# Patient Record
Sex: FEMALE | Race: BLACK OR AFRICAN AMERICAN | Employment: OTHER | URBAN - METROPOLITAN AREA
[De-identification: names, ages, dates, MRNs, and addresses within clinical notes are randomized per-mention and may not be internally consistent; named-entity substitution may affect disease eponyms.]

---

## 2021-01-20 ENCOUNTER — HOSPITAL ENCOUNTER (EMERGENCY)
Age: 31
Discharge: HOME | End: 2021-01-20
Attending: OBSTETRICS & GYNECOLOGY | Admitting: STUDENT IN AN ORGANIZED HEALTH CARE EDUCATION/TRAINING PROGRAM

## 2021-01-20 VITALS — HEIGHT: 61 IN | BODY MASS INDEX: 48.96 KG/M2 | WEIGHT: 259.3 LBS

## 2021-01-20 NOTE — PROGRESS NOTES
Pt here for airway check due to high BMI. Airway exam showed good TM distance, MP 1, good neck ROM. Should be an appropriate patient to deliver here.

## 2021-02-23 ENCOUNTER — HOSPITAL ENCOUNTER (INPATIENT)
Age: 31
LOS: 3 days | Discharge: HOME | End: 2021-02-26
Attending: OBSTETRICS & GYNECOLOGY | Admitting: OBSTETRICS & GYNECOLOGY
Payer: COMMERCIAL

## 2021-02-23 ENCOUNTER — ANESTHESIA (OUTPATIENT)
Dept: LABOR AND DELIVERY | Age: 31
End: 2021-02-23
Payer: COMMERCIAL

## 2021-02-23 ENCOUNTER — ANESTHESIA EVENT (OUTPATIENT)
Dept: LABOR AND DELIVERY | Age: 31
End: 2021-02-23
Payer: COMMERCIAL

## 2021-02-23 DIAGNOSIS — Z33.1 IUP (INTRAUTERINE PREGNANCY), INCIDENTAL: Primary | ICD-10-CM

## 2021-02-23 LAB
ABO + RH BLD: NORMAL
BASOPHILS # BLD: 0 K/UL (ref 0–0.1)
BASOPHILS NFR BLD: 0 % (ref 0–2)
BLOOD GROUP ANTIBODIES SERPL: NORMAL
DIFFERENTIAL METHOD BLD: ABNORMAL
EOSINOPHIL # BLD: 0 K/UL (ref 0–0.4)
EOSINOPHIL NFR BLD: 0 % (ref 0–5)
ERYTHROCYTE [DISTWIDTH] IN BLOOD BY AUTOMATED COUNT: 13.1 % (ref 11.6–14.5)
HCT VFR BLD AUTO: 40.4 % (ref 35–45)
HGB BLD-MCNC: 13.3 G/DL (ref 12–16)
LYMPHOCYTES # BLD: 1 K/UL (ref 0.9–3.6)
LYMPHOCYTES NFR BLD: 9 % (ref 21–52)
MCH RBC QN AUTO: 30.4 PG (ref 24–34)
MCHC RBC AUTO-ENTMCNC: 32.9 G/DL (ref 31–37)
MCV RBC AUTO: 92.4 FL (ref 74–97)
MONOCYTES # BLD: 0.6 K/UL (ref 0.05–1.2)
MONOCYTES NFR BLD: 6 % (ref 3–10)
NEUTS SEG # BLD: 9.1 K/UL (ref 1.8–8)
NEUTS SEG NFR BLD: 85 % (ref 40–73)
PLATELET # BLD AUTO: 139 K/UL (ref 135–420)
PMV BLD AUTO: 12.7 FL (ref 9.2–11.8)
RBC # BLD AUTO: 4.37 M/UL (ref 4.2–5.3)
SPECIMEN EXP DATE BLD: NORMAL
WBC # BLD AUTO: 10.8 K/UL (ref 4.6–13.2)

## 2021-02-23 PROCEDURE — 74011250636 HC RX REV CODE- 250/636: Performed by: NURSE ANESTHETIST, CERTIFIED REGISTERED

## 2021-02-23 PROCEDURE — 85025 COMPLETE CBC W/AUTO DIFF WBC: CPT

## 2021-02-23 PROCEDURE — 74011000250 HC RX REV CODE- 250: Performed by: NURSE ANESTHETIST, CERTIFIED REGISTERED

## 2021-02-23 PROCEDURE — 74011250636 HC RX REV CODE- 250/636: Performed by: ADVANCED PRACTICE MIDWIFE

## 2021-02-23 PROCEDURE — 75410000003 HC RECOV DEL/VAG/CSECN EA 0.5 HR

## 2021-02-23 PROCEDURE — 77030031139 HC SUT VCRL2 J&J -A: Performed by: OBSTETRICS & GYNECOLOGY

## 2021-02-23 PROCEDURE — 74011250636 HC RX REV CODE- 250/636: Performed by: MIDWIFE

## 2021-02-23 PROCEDURE — 74011250636 HC RX REV CODE- 250/636: Performed by: ANESTHESIOLOGY

## 2021-02-23 PROCEDURE — 75410000003 HC RECOV DEL/VAG/CSECN EA 0.5 HR: Performed by: OBSTETRICS & GYNECOLOGY

## 2021-02-23 PROCEDURE — 77030040830 HC CATH URETH FOL MDII -A

## 2021-02-23 PROCEDURE — 77030032060 HC PWDR HEMSTAT ARISTA ASRB 3GM BARD -C: Performed by: OBSTETRICS & GYNECOLOGY

## 2021-02-23 PROCEDURE — 74011000250 HC RX REV CODE- 250

## 2021-02-23 PROCEDURE — 74011000250 HC RX REV CODE- 250: Performed by: ANESTHESIOLOGY

## 2021-02-23 PROCEDURE — 74011250637 HC RX REV CODE- 250/637: Performed by: ADVANCED PRACTICE MIDWIFE

## 2021-02-23 PROCEDURE — 99283 EMERGENCY DEPT VISIT LOW MDM: CPT

## 2021-02-23 PROCEDURE — 77010026065 HC OXYGEN MINIMUM MEDICAL AIR

## 2021-02-23 PROCEDURE — 74011000258 HC RX REV CODE- 258: Performed by: MIDWIFE

## 2021-02-23 PROCEDURE — 88307 TISSUE EXAM BY PATHOLOGIST: CPT

## 2021-02-23 PROCEDURE — 76060000032 HC ANESTHESIA 0.5 TO 1 HR: Performed by: OBSTETRICS & GYNECOLOGY

## 2021-02-23 PROCEDURE — 77030009413 HC ELECTRD SCALP COVD -A

## 2021-02-23 PROCEDURE — 75410000002 HC LABOR FEE PER 1 HR

## 2021-02-23 PROCEDURE — 76010000391 HC C SECN FIRST 1 HR: Performed by: OBSTETRICS & GYNECOLOGY

## 2021-02-23 PROCEDURE — 65270000029 HC RM PRIVATE

## 2021-02-23 PROCEDURE — 77030040361 HC SLV COMPR DVT MDII -B: Performed by: OBSTETRICS & GYNECOLOGY

## 2021-02-23 PROCEDURE — 77030027138 HC INCENT SPIROMETER -A

## 2021-02-23 PROCEDURE — 86900 BLOOD TYPING SEROLOGIC ABO: CPT

## 2021-02-23 PROCEDURE — 59025 FETAL NON-STRESS TEST: CPT

## 2021-02-23 PROCEDURE — 2709999900 HC NON-CHARGEABLE SUPPLY: Performed by: OBSTETRICS & GYNECOLOGY

## 2021-02-23 PROCEDURE — 76060000078 HC EPIDURAL ANESTHESIA

## 2021-02-23 PROCEDURE — 77030010848 HC CATH INTUTR PRSS KOLB -B

## 2021-02-23 PROCEDURE — 77030019905 HC CATH URETH INTMIT MDII -A

## 2021-02-23 PROCEDURE — 4A1HXCZ MONITORING OF PRODUCTS OF CONCEPTION, CARDIAC RATE, EXTERNAL APPROACH: ICD-10-PCS | Performed by: OBSTETRICS & GYNECOLOGY

## 2021-02-23 RX ORDER — MINERAL OIL
OIL (ML) ORAL
Status: DISCONTINUED
Start: 2021-02-23 | End: 2021-02-23 | Stop reason: WASHOUT

## 2021-02-23 RX ORDER — FENTANYL CITRATE 50 UG/ML
100 INJECTION, SOLUTION INTRAMUSCULAR; INTRAVENOUS ONCE
Status: ACTIVE | OUTPATIENT
Start: 2021-02-23 | End: 2021-02-23

## 2021-02-23 RX ORDER — SODIUM CHLORIDE 0.9 % (FLUSH) 0.9 %
5-40 SYRINGE (ML) INJECTION EVERY 8 HOURS
Status: DISCONTINUED | OUTPATIENT
Start: 2021-02-23 | End: 2021-02-23 | Stop reason: HOSPADM

## 2021-02-23 RX ORDER — ACETAMINOPHEN 325 MG/1
650 TABLET ORAL
Status: DISCONTINUED | OUTPATIENT
Start: 2021-02-23 | End: 2021-02-26 | Stop reason: HOSPADM

## 2021-02-23 RX ORDER — ZOLPIDEM TARTRATE 5 MG/1
5 TABLET ORAL
Status: DISCONTINUED | OUTPATIENT
Start: 2021-02-23 | End: 2021-02-26 | Stop reason: HOSPADM

## 2021-02-23 RX ORDER — NALOXONE HYDROCHLORIDE 0.4 MG/ML
0.2 INJECTION, SOLUTION INTRAMUSCULAR; INTRAVENOUS; SUBCUTANEOUS AS NEEDED
Status: DISCONTINUED | OUTPATIENT
Start: 2021-02-23 | End: 2021-02-23 | Stop reason: HOSPADM

## 2021-02-23 RX ORDER — KETOROLAC TROMETHAMINE 30 MG/ML
30 INJECTION, SOLUTION INTRAMUSCULAR; INTRAVENOUS
Status: DISCONTINUED | OUTPATIENT
Start: 2021-02-23 | End: 2021-02-23 | Stop reason: SDUPTHER

## 2021-02-23 RX ORDER — LIDOCAINE HYDROCHLORIDE AND EPINEPHRINE 15; 5 MG/ML; UG/ML
INJECTION, SOLUTION EPIDURAL
Status: SHIPPED | OUTPATIENT
Start: 2021-02-23 | End: 2021-02-23

## 2021-02-23 RX ORDER — PROMETHAZINE HYDROCHLORIDE 25 MG/ML
25 INJECTION, SOLUTION INTRAMUSCULAR; INTRAVENOUS
Status: DISCONTINUED | OUTPATIENT
Start: 2021-02-23 | End: 2021-02-26 | Stop reason: HOSPADM

## 2021-02-23 RX ORDER — LIDOCAINE HYDROCHLORIDE 10 MG/ML
INJECTION INFILTRATION; PERINEURAL AS NEEDED
Status: DISCONTINUED | OUTPATIENT
Start: 2021-02-23 | End: 2021-02-23 | Stop reason: HOSPADM

## 2021-02-23 RX ORDER — NALBUPHINE HYDROCHLORIDE 10 MG/ML
10 INJECTION, SOLUTION INTRAMUSCULAR; INTRAVENOUS; SUBCUTANEOUS
Status: DISCONTINUED | OUTPATIENT
Start: 2021-02-23 | End: 2021-02-23 | Stop reason: HOSPADM

## 2021-02-23 RX ORDER — DIPHENHYDRAMINE HCL 25 MG
25 CAPSULE ORAL
Status: ACTIVE | OUTPATIENT
Start: 2021-02-23 | End: 2021-02-24

## 2021-02-23 RX ORDER — SODIUM CHLORIDE 0.9 % (FLUSH) 0.9 %
5-40 SYRINGE (ML) INJECTION AS NEEDED
Status: DISCONTINUED | OUTPATIENT
Start: 2021-02-23 | End: 2021-02-26 | Stop reason: HOSPADM

## 2021-02-23 RX ORDER — PHENYLEPHRINE HCL IN 0.9% NACL 1 MG/10 ML
80 SYRINGE (ML) INTRAVENOUS AS NEEDED
Status: DISCONTINUED | OUTPATIENT
Start: 2021-02-23 | End: 2021-02-23 | Stop reason: HOSPADM

## 2021-02-23 RX ORDER — OXYTOCIN/RINGER'S LACTATE 30/500 ML
10 PLASTIC BAG, INJECTION (ML) INTRAVENOUS AS NEEDED
Status: DISCONTINUED | OUTPATIENT
Start: 2021-02-23 | End: 2021-02-26 | Stop reason: HOSPADM

## 2021-02-23 RX ORDER — SODIUM CHLORIDE, SODIUM LACTATE, POTASSIUM CHLORIDE, CALCIUM CHLORIDE 600; 310; 30; 20 MG/100ML; MG/100ML; MG/100ML; MG/100ML
125 INJECTION, SOLUTION INTRAVENOUS CONTINUOUS
Status: DISCONTINUED | OUTPATIENT
Start: 2021-02-23 | End: 2021-02-23 | Stop reason: HOSPADM

## 2021-02-23 RX ORDER — FENTANYL/ROPIVACAINE/NS/PF 2MCG/ML-.1
1-15 PLASTIC BAG, INJECTION (ML) EPIDURAL CONTINUOUS
Status: DISCONTINUED | OUTPATIENT
Start: 2021-02-23 | End: 2021-02-23 | Stop reason: HOSPADM

## 2021-02-23 RX ORDER — DIPHENHYDRAMINE HYDROCHLORIDE 50 MG/ML
25 INJECTION, SOLUTION INTRAMUSCULAR; INTRAVENOUS ONCE
Status: COMPLETED | OUTPATIENT
Start: 2021-02-23 | End: 2021-02-23

## 2021-02-23 RX ORDER — BUTORPHANOL TARTRATE 2 MG/ML
2 INJECTION INTRAMUSCULAR; INTRAVENOUS
Status: DISCONTINUED | OUTPATIENT
Start: 2021-02-23 | End: 2021-02-23 | Stop reason: HOSPADM

## 2021-02-23 RX ORDER — SIMETHICONE 80 MG
80 TABLET,CHEWABLE ORAL
Status: DISCONTINUED | OUTPATIENT
Start: 2021-02-23 | End: 2021-02-26 | Stop reason: HOSPADM

## 2021-02-23 RX ORDER — CEFAZOLIN SODIUM/WATER 2 G/20 ML
SYRINGE (ML) INTRAVENOUS
Status: DISPENSED
Start: 2021-02-23 | End: 2021-02-24

## 2021-02-23 RX ORDER — OXYTOCIN/0.9 % SODIUM CHLORIDE 30/500 ML
PLASTIC BAG, INJECTION (ML) INTRAVENOUS
Status: DISPENSED
Start: 2021-02-23 | End: 2021-02-23

## 2021-02-23 RX ORDER — CHOLECALCIFEROL (VITAMIN D3) 50 MCG
CAPSULE ORAL
COMMUNITY

## 2021-02-23 RX ORDER — CEFAZOLIN SODIUM 1 G/3ML
INJECTION, POWDER, FOR SOLUTION INTRAMUSCULAR; INTRAVENOUS AS NEEDED
Status: DISCONTINUED | OUTPATIENT
Start: 2021-02-23 | End: 2021-02-23 | Stop reason: HOSPADM

## 2021-02-23 RX ORDER — MORPHINE SULFATE 1 MG/ML
3 INJECTION, SOLUTION EPIDURAL; INTRATHECAL; INTRAVENOUS ONCE
Status: DISCONTINUED | OUTPATIENT
Start: 2021-02-23 | End: 2021-02-23 | Stop reason: HOSPADM

## 2021-02-23 RX ORDER — FENTANYL CITRATE 50 UG/ML
INJECTION, SOLUTION INTRAMUSCULAR; INTRAVENOUS AS NEEDED
Status: DISCONTINUED | OUTPATIENT
Start: 2021-02-23 | End: 2021-02-23 | Stop reason: HOSPADM

## 2021-02-23 RX ORDER — OXYTOCIN/0.9 % SODIUM CHLORIDE 30/500 ML
87.3 PLASTIC BAG, INJECTION (ML) INTRAVENOUS AS NEEDED
Status: DISCONTINUED | OUTPATIENT
Start: 2021-02-23 | End: 2021-02-26 | Stop reason: HOSPADM

## 2021-02-23 RX ORDER — SODIUM CHLORIDE 0.9 % (FLUSH) 0.9 %
5-40 SYRINGE (ML) INJECTION AS NEEDED
Status: DISCONTINUED | OUTPATIENT
Start: 2021-02-23 | End: 2021-02-23 | Stop reason: HOSPADM

## 2021-02-23 RX ORDER — KETOROLAC TROMETHAMINE 30 MG/ML
30 INJECTION, SOLUTION INTRAMUSCULAR; INTRAVENOUS EVERY 6 HOURS
Status: DISCONTINUED | OUTPATIENT
Start: 2021-02-23 | End: 2021-02-24

## 2021-02-23 RX ORDER — HYDROMORPHONE HYDROCHLORIDE 1 MG/ML
1 INJECTION, SOLUTION INTRAMUSCULAR; INTRAVENOUS; SUBCUTANEOUS
Status: DISCONTINUED | OUTPATIENT
Start: 2021-02-23 | End: 2021-02-23 | Stop reason: HOSPADM

## 2021-02-23 RX ORDER — OXYTOCIN/0.9 % SODIUM CHLORIDE 30/500 ML
0-20 PLASTIC BAG, INJECTION (ML) INTRAVENOUS
Status: DISCONTINUED | OUTPATIENT
Start: 2021-02-23 | End: 2021-02-26 | Stop reason: HOSPADM

## 2021-02-23 RX ORDER — METHYLERGONOVINE MALEATE 0.2 MG/ML
0.2 INJECTION INTRAVENOUS AS NEEDED
Status: DISCONTINUED | OUTPATIENT
Start: 2021-02-23 | End: 2021-02-23 | Stop reason: HOSPADM

## 2021-02-23 RX ORDER — FACIAL-BODY WIPES
10 EACH TOPICAL
Status: DISCONTINUED | OUTPATIENT
Start: 2021-02-23 | End: 2021-02-26 | Stop reason: HOSPADM

## 2021-02-23 RX ORDER — LIDOCAINE HYDROCHLORIDE AND EPINEPHRINE 15; 5 MG/ML; UG/ML
INJECTION, SOLUTION EPIDURAL AS NEEDED
Status: DISCONTINUED | OUTPATIENT
Start: 2021-02-23 | End: 2021-02-23 | Stop reason: HOSPADM

## 2021-02-23 RX ORDER — SODIUM CHLORIDE, SODIUM LACTATE, POTASSIUM CHLORIDE, CALCIUM CHLORIDE 600; 310; 30; 20 MG/100ML; MG/100ML; MG/100ML; MG/100ML
125 INJECTION, SOLUTION INTRAVENOUS CONTINUOUS
Status: DISPENSED | OUTPATIENT
Start: 2021-02-23 | End: 2021-02-24

## 2021-02-23 RX ORDER — SODIUM CHLORIDE 0.9 % (FLUSH) 0.9 %
5-40 SYRINGE (ML) INJECTION EVERY 8 HOURS
Status: DISCONTINUED | OUTPATIENT
Start: 2021-02-23 | End: 2021-02-26 | Stop reason: HOSPADM

## 2021-02-23 RX ORDER — MINERAL OIL
30 OIL (ML) ORAL AS NEEDED
Status: DISCONTINUED | OUTPATIENT
Start: 2021-02-23 | End: 2021-02-23 | Stop reason: HOSPADM

## 2021-02-23 RX ORDER — LIDOCAINE HYDROCHLORIDE 10 MG/ML
INJECTION INFILTRATION; PERINEURAL
Status: DISCONTINUED
Start: 2021-02-23 | End: 2021-02-23 | Stop reason: WASHOUT

## 2021-02-23 RX ORDER — LIDOCAINE HYDROCHLORIDE AND EPINEPHRINE 20; 5 MG/ML; UG/ML
INJECTION, SOLUTION EPIDURAL; INFILTRATION; INTRACAUDAL; PERINEURAL AS NEEDED
Status: DISCONTINUED | OUTPATIENT
Start: 2021-02-23 | End: 2021-02-23 | Stop reason: HOSPADM

## 2021-02-23 RX ORDER — ONDANSETRON 2 MG/ML
4 INJECTION INTRAMUSCULAR; INTRAVENOUS
Status: DISCONTINUED | OUTPATIENT
Start: 2021-02-23 | End: 2021-02-24 | Stop reason: SDUPTHER

## 2021-02-23 RX ORDER — HYDROCORTISONE 25 MG/G
CREAM TOPICAL AS NEEDED
Status: DISCONTINUED | OUTPATIENT
Start: 2021-02-23 | End: 2021-02-26 | Stop reason: HOSPADM

## 2021-02-23 RX ORDER — CEFAZOLIN SODIUM/WATER 2 G/20 ML
2 SYRINGE (ML) INTRAVENOUS ONCE
Status: DISCONTINUED | OUTPATIENT
Start: 2021-02-23 | End: 2021-02-23 | Stop reason: HOSPADM

## 2021-02-23 RX ORDER — FENTANYL/ROPIVACAINE/NS/PF 2MCG/ML-.1
PLASTIC BAG, INJECTION (ML) EPIDURAL
Status: COMPLETED
Start: 2021-02-23 | End: 2021-02-23

## 2021-02-23 RX ORDER — ONDANSETRON 2 MG/ML
4 INJECTION INTRAMUSCULAR; INTRAVENOUS
Status: DISCONTINUED | OUTPATIENT
Start: 2021-02-23 | End: 2021-02-26 | Stop reason: HOSPADM

## 2021-02-23 RX ORDER — NALBUPHINE HYDROCHLORIDE 10 MG/ML
5 INJECTION, SOLUTION INTRAMUSCULAR; INTRAVENOUS; SUBCUTANEOUS
Status: DISCONTINUED | OUTPATIENT
Start: 2021-02-23 | End: 2021-02-26 | Stop reason: HOSPADM

## 2021-02-23 RX ORDER — ONDANSETRON 2 MG/ML
4 INJECTION INTRAMUSCULAR; INTRAVENOUS
Status: DISCONTINUED | OUTPATIENT
Start: 2021-02-23 | End: 2021-02-23 | Stop reason: HOSPADM

## 2021-02-23 RX ORDER — DIPHENHYDRAMINE HYDROCHLORIDE 50 MG/ML
25 INJECTION, SOLUTION INTRAMUSCULAR; INTRAVENOUS
Status: DISCONTINUED | OUTPATIENT
Start: 2021-02-23 | End: 2021-02-26 | Stop reason: HOSPADM

## 2021-02-23 RX ORDER — FENTANYL CITRATE 50 UG/ML
INJECTION, SOLUTION INTRAMUSCULAR; INTRAVENOUS
Status: COMPLETED
Start: 2021-02-23 | End: 2021-02-23

## 2021-02-23 RX ORDER — NALOXONE HYDROCHLORIDE 0.4 MG/ML
0.4 INJECTION, SOLUTION INTRAMUSCULAR; INTRAVENOUS; SUBCUTANEOUS
Status: ACTIVE | OUTPATIENT
Start: 2021-02-23 | End: 2021-02-24

## 2021-02-23 RX ORDER — IBUPROFEN 400 MG/1
800 TABLET ORAL
Status: DISCONTINUED | OUTPATIENT
Start: 2021-02-26 | End: 2021-02-24

## 2021-02-23 RX ORDER — TERBUTALINE SULFATE 1 MG/ML
0.25 INJECTION SUBCUTANEOUS
Status: DISCONTINUED | OUTPATIENT
Start: 2021-02-23 | End: 2021-02-23 | Stop reason: HOSPADM

## 2021-02-23 RX ORDER — OXYCODONE HYDROCHLORIDE 5 MG/1
10 TABLET ORAL
Status: ACTIVE | OUTPATIENT
Start: 2021-02-23 | End: 2021-02-24

## 2021-02-23 RX ORDER — OXYTOCIN/RINGER'S LACTATE 30/500 ML
10 PLASTIC BAG, INJECTION (ML) INTRAVENOUS AS NEEDED
Status: DISCONTINUED | OUTPATIENT
Start: 2021-02-23 | End: 2021-02-23 | Stop reason: HOSPADM

## 2021-02-23 RX ORDER — OXYCODONE AND ACETAMINOPHEN 5; 325 MG/1; MG/1
1-2 TABLET ORAL
Status: DISCONTINUED | OUTPATIENT
Start: 2021-02-23 | End: 2021-02-24

## 2021-02-23 RX ORDER — LIDOCAINE HYDROCHLORIDE 10 MG/ML
20 INJECTION, SOLUTION EPIDURAL; INFILTRATION; INTRACAUDAL; PERINEURAL AS NEEDED
Status: DISCONTINUED | OUTPATIENT
Start: 2021-02-23 | End: 2021-02-23 | Stop reason: HOSPADM

## 2021-02-23 RX ORDER — ACETAMINOPHEN 500 MG
1000 TABLET ORAL EVERY 6 HOURS
Status: DISCONTINUED | OUTPATIENT
Start: 2021-02-23 | End: 2021-02-26 | Stop reason: HOSPADM

## 2021-02-23 RX ORDER — MORPHINE SULFATE 1 MG/ML
INJECTION, SOLUTION EPIDURAL; INTRATHECAL; INTRAVENOUS AS NEEDED
Status: DISCONTINUED | OUTPATIENT
Start: 2021-02-23 | End: 2021-02-23 | Stop reason: HOSPADM

## 2021-02-23 RX ORDER — OXYTOCIN/0.9 % SODIUM CHLORIDE 30/500 ML
PLASTIC BAG, INJECTION (ML) INTRAVENOUS
Status: COMPLETED
Start: 2021-02-23 | End: 2021-02-23

## 2021-02-23 RX ORDER — OXYTOCIN/0.9 % SODIUM CHLORIDE 30/500 ML
87.3 PLASTIC BAG, INJECTION (ML) INTRAVENOUS AS NEEDED
Status: DISCONTINUED | OUTPATIENT
Start: 2021-02-23 | End: 2021-02-23 | Stop reason: HOSPADM

## 2021-02-23 RX ADMIN — SODIUM CHLORIDE, SODIUM LACTATE, POTASSIUM CHLORIDE, AND CALCIUM CHLORIDE: 600; 310; 30; 20 INJECTION, SOLUTION INTRAVENOUS at 17:23

## 2021-02-23 RX ADMIN — SODIUM CHLORIDE 5 MILLION UNITS: 900 INJECTION INTRAVENOUS at 01:53

## 2021-02-23 RX ADMIN — ONDANSETRON 4 MG: 2 INJECTION INTRAMUSCULAR; INTRAVENOUS at 10:46

## 2021-02-23 RX ADMIN — SODIUM CHLORIDE 2.5 MILLION UNITS: 9 INJECTION, SOLUTION INTRAVENOUS at 10:25

## 2021-02-23 RX ADMIN — MORPHINE SULFATE 3 MG: 1 INJECTION, SOLUTION EPIDURAL; INTRATHECAL; INTRAVENOUS at 17:20

## 2021-02-23 RX ADMIN — SODIUM CHLORIDE, SODIUM LACTATE, POTASSIUM CHLORIDE, AND CALCIUM CHLORIDE 125 ML/HR: 600; 310; 30; 20 INJECTION, SOLUTION INTRAVENOUS at 02:00

## 2021-02-23 RX ADMIN — FENTANYL CITRATE 100 MCG: 50 INJECTION, SOLUTION INTRAMUSCULAR; INTRAVENOUS at 02:38

## 2021-02-23 RX ADMIN — CEFAZOLIN 2 G: 1 INJECTION, POWDER, FOR SOLUTION INTRAMUSCULAR; INTRAVENOUS at 17:07

## 2021-02-23 RX ADMIN — Medication 12 ML/HR: at 02:54

## 2021-02-23 RX ADMIN — SODIUM CHLORIDE, SODIUM LACTATE, POTASSIUM CHLORIDE, AND CALCIUM CHLORIDE 500 ML: 600; 310; 30; 20 INJECTION, SOLUTION INTRAVENOUS at 15:08

## 2021-02-23 RX ADMIN — OXYCODONE AND ACETAMINOPHEN 1 TABLET: 5; 325 TABLET ORAL at 20:30

## 2021-02-23 RX ADMIN — LIDOCAINE HYDROCHLORIDE,EPINEPHRINE BITARTRATE 3 ML: 20; .005 INJECTION, SOLUTION EPIDURAL; INFILTRATION; INTRACAUDAL; PERINEURAL at 16:52

## 2021-02-23 RX ADMIN — Medication 2 MILLI-UNITS/MIN: at 08:47

## 2021-02-23 RX ADMIN — LIDOCAINE HYDROCHLORIDE 5 ML: 10 INJECTION, SOLUTION INFILTRATION; PERINEURAL at 02:32

## 2021-02-23 RX ADMIN — LIDOCAINE HYDROCHLORIDE,EPINEPHRINE BITARTRATE 2 ML: 15; .005 INJECTION, SOLUTION EPIDURAL; INFILTRATION; INTRACAUDAL; PERINEURAL at 02:35

## 2021-02-23 RX ADMIN — SODIUM CHLORIDE 2.5 MILLION UNITS: 9 INJECTION, SOLUTION INTRAVENOUS at 05:45

## 2021-02-23 RX ADMIN — LIDOCAINE HYDROCHLORIDE,EPINEPHRINE BITARTRATE 4 ML: 20; .005 INJECTION, SOLUTION EPIDURAL; INFILTRATION; INTRACAUDAL; PERINEURAL at 17:00

## 2021-02-23 RX ADMIN — KETOROLAC TROMETHAMINE 30 MG: 30 INJECTION, SOLUTION INTRAMUSCULAR at 19:07

## 2021-02-23 RX ADMIN — Medication 333 UNITS/HR: at 17:16

## 2021-02-23 RX ADMIN — SODIUM CHLORIDE, SODIUM LACTATE, POTASSIUM CHLORIDE, AND CALCIUM CHLORIDE 1000 ML: 600; 310; 30; 20 INJECTION, SOLUTION INTRAVENOUS at 02:22

## 2021-02-23 RX ADMIN — LIDOCAINE HYDROCHLORIDE,EPINEPHRINE BITARTRATE 5 ML: 20; .005 INJECTION, SOLUTION EPIDURAL; INFILTRATION; INTRACAUDAL; PERINEURAL at 16:55

## 2021-02-23 RX ADMIN — LIDOCAINE HYDROCHLORIDE,EPINEPHRINE BITARTRATE 3 ML: 15; .005 INJECTION, SOLUTION EPIDURAL; INFILTRATION; INTRACAUDAL; PERINEURAL at 02:30

## 2021-02-23 RX ADMIN — DIPHENHYDRAMINE HYDROCHLORIDE 25 MG: 50 INJECTION, SOLUTION INTRAMUSCULAR; INTRAVENOUS at 14:32

## 2021-02-23 NOTE — PROGRESS NOTES
Bedside shift change report given to CHETAN Murillo (oncoming nurse) by SIERRA Lin RN (offgoing nurse). Report included the following information SBAR, Kardex, Intake/Output, MAR and Recent Results.

## 2021-02-23 NOTE — PROGRESS NOTES
Pt still with thick anterior lip but FHR with deep variables that return to baseline. Attempted pushing to see if baby's head would turn but no progress after pushing several times in supine and right lateral position. Stopped pushing and pt repositioned in hands and knees position. Will reassess for fetal head position. Pt feeling some abdominal pressure but no vaginal/recatal pressure with uc's.    Serina Soto, SHARMILAM

## 2021-02-23 NOTE — PROGRESS NOTES
Pt has not voided since getting epidural around 0200 this morning. Pt unable to void on bedpan. Sterile st cath complete. Pt tolerated well and assisted to left lateral fire hydrant position.

## 2021-02-23 NOTE — PROGRESS NOTES
Dr. Rachael Covington at bedside to discuss POC for Primary  Section at this time. Pt agrees to [facility].

## 2021-02-23 NOTE — PROGRESS NOTES
FSE and IUPC applied per Mi Amezcua CNM. Pt tolerated well and assisted to hands and knees position in trendelenburg position. Pt tolerated well. Pt denies further needs or concerns at this time.

## 2021-02-23 NOTE — H&P
History & Physical    Name: Devon Chawla MRN: [id number]  SSN: [id number]    YOB: 1990  Age: 26 y.o. Sex: female        Subjective:     Estimated Date of Delivery: 21  OB History        3    Para   2    Term                AB        Living   2       SAB        TAB        Ectopic        Molar        Multiple        Live Births                      Ms. Vanessa Olivas is admitted with pregnancy at 40w6d for Increasingly painful contractions. Prenatal course was complicated by obesity, urinary tract dilation. Please see prenatal records for details. Not on File    Objective:     Vitals:  Vitals:    21 0103 21 0105   BP:  116/65   Pulse:  92   Resp:  18   Temp:  98.6 °F (37 °C)   Weight: 115.7 kg (255 lb)    Height: 5' (1.524 m)         Physical Exam:  Patient without distress. Heart: Regular rate and rhythm, S1S2 present or without murmur or extra heart sounds  Lung: clear to auscultation throughout lung fields, no wheezes, no rales, no rhonchi and normal respiratory effort  Abdomen: soft, nontender  Fundus: soft and non tender  Perineum: blood absent, amniotic fluid absent  Cervical Exam: 6 cm dilated  By RN exam  Membranes:  Intact  Fetal Heart Rate & Contraction pattern: Baseline: 135 per minute  Variability: moderate  Accelerations: yes  Decelerations: none  Uterine contractions: regular, every 2-4 minutes    Prenatal Labs:   No results found for: RUBELLAEXT, GRBSEXT, HBSAGEXT, HIVEXT, RPREXT, GONNOEXT, CHLAMEXT      Assessment/Plan:     Plan: Admit for Reassuring fetal status, Labor  Progressing normally  Continue expectant management, Continue plan for vaginal delivery. Group B Strep was positive, will treat prophylactically with penicillin.    -continuous efm x2  -PIV, LR  -CBC, t&s  -anesthesia consult, pt desires epidural  -Anticipate vaginal delivery, c/d for maternal/fetal indications. Dr. Mónica Rome aware of admission and POC at this time.      Signed By:  Albin Prader Frank Cronin     February 23, 2021

## 2021-02-23 NOTE — LACTATION NOTE
This note was copied from a baby's chart. 0 Mom educated on breastfeeding basics--hunger cues, feeding on demand, waking baby if baby sleeps too long between feeds, importance of skin to skin, positioning and latching, risk of pacifier use and supplemental feedings, and importance of rooming in--and use of log sheet. Mom also educated on benefits of breastfeeding for herself and baby. Mom verbalized understanding. No questions at this time. 1826 infant latched and nursing well at this time.

## 2021-02-23 NOTE — ANESTHESIA PROCEDURE NOTES
Epidural Block    Patient location during procedure: OB  Start time: 2/23/2021 2:30 AM  End time: 2/23/2021 4:56 AM  Reason for block: labor epidural  Staffing  Performed: attending   Anesthesiologist: Tom Vallecillo MD  Preanesthetic Checklist  Completed: patient identified, IV checked, site marked, risks and benefits discussed, surgical consent, monitors and equipment checked, pre-op evaluation and timeout performed  Block Placement  Patient position: sitting  Prep: ChloraPrep  Sterility prep: cap, drape, gloves and mask  Sedation level: no sedation  Patient monitoring: continuous pulse oximetry, frequent blood pressure checks and heart rate  Approach: midline  Location: lumbar  Epidural  Loss of resistance technique: saline  Guidance: landmark technique and ultrasound guided  Needle  Needle type: Tuohy   Needle gauge: 19 G  Needle length: 9 cm  Needle insertion depth: 7 cm  Catheter type: multi-orifice  Catheter size: 20 G  Catheter at skin depth: 12 cm  Catheter securement method: clear occlusive dressing, surgical tape and liquid medical adhesive  Test dose: negative  Medications Administered  Lidocaine-EPINEPHrine (XYLOCAINE) 1.5 %-1:200,000 Epidural, 3 mL  Assessment  Sensory level: T10  Block outcome: pain improved  Number of attempts: 1  Procedure assessment: patient tolerated procedure well with no complications  Additional Notes  After epidural placement, patient without sensory or motor block. Patient denies headache or backache.

## 2021-02-23 NOTE — PROGRESS NOTES
Problem: Patient Education: Go to Patient Education Activity  Goal: Patient/Family Education  Outcome: Progressing Towards Goal     Problem: Vaginal Delivery: Day of Deliver-Laboring  Goal: Off Pathway (Use only if patient is Off Pathway)  Outcome: Progressing Towards Goal  Goal: Activity/Safety  Outcome: Progressing Towards Goal  Goal: Consults, if ordered  Outcome: Progressing Towards Goal  Goal: Diagnostic Test/Procedures  Outcome: Progressing Towards Goal  Goal: Nutrition/Diet  Outcome: Progressing Towards Goal  Goal: Discharge Planning  Outcome: Progressing Towards Goal  Goal: Medications  Outcome: Progressing Towards Goal  Goal: Respiratory  Outcome: Progressing Towards Goal  Goal: Treatments/Interventions/Procedures  Outcome: Progressing Towards Goal  Goal: *Vital signs within defined limits  Outcome: Progressing Towards Goal  Goal: *Labs within defined limits  Outcome: Progressing Towards Goal  Goal: *Hemodynamically stable  Outcome: Progressing Towards Goal  Goal: *Optimal pain control at patient's stated goal  Outcome: Progressing Towards Goal     Problem: Vaginal Delivery: Day of Delivery-Post delivery  Goal: Off Pathway (Use only if patient is Off Pathway)  Outcome: Progressing Towards Goal  Goal: Activity/Safety  Outcome: Progressing Towards Goal  Goal: Consults, if ordered  Outcome: Progressing Towards Goal  Goal: Nutrition/Diet  Outcome: Progressing Towards Goal  Goal: Discharge Planning  Outcome: Progressing Towards Goal  Goal: Medications  Outcome: Progressing Towards Goal  Goal: Treatments/Interventions/Procedures  Outcome: Progressing Towards Goal  Goal: *Vital signs within defined limits  Outcome: Progressing Towards Goal  Goal: *Labs within defined limits  Outcome: Progressing Towards Goal  Goal: *Hemodynamically stable  Outcome: Progressing Towards Goal  Goal: *Optimal pain control at patient's stated goal  Outcome: Progressing Towards Goal  Goal: *Participates in infant care  Outcome: Progressing Towards Goal  Goal: *Demonstrates progressive activity  Outcome: Progressing Towards Goal  Goal: *Tolerating diet  Outcome: Progressing Towards Goal     Problem: Vaginal Delivery: Postpartum Day 1  Goal: Off Pathway (Use only if patient is Off Pathway)  Outcome: Progressing Towards Goal  Goal: Activity/Safety  Outcome: Progressing Towards Goal  Goal: Consults, if ordered  Outcome: Progressing Towards Goal  Goal: Diagnostic Test/Procedures  Outcome: Progressing Towards Goal  Goal: Nutrition/Diet  Outcome: Progressing Towards Goal  Goal: Discharge Planning  Outcome: Progressing Towards Goal  Goal: Medications  Outcome: Progressing Towards Goal  Goal: Treatments/Interventions/Procedures  Outcome: Progressing Towards Goal  Goal: Psychosocial  Outcome: Progressing Towards Goal  Goal: *Vital signs within defined limits  Outcome: Progressing Towards Goal  Goal: *Labs within defined limits  Outcome: Progressing Towards Goal  Goal: *Hemodynamically stable  Outcome: Progressing Towards Goal  Goal: *Optimal pain control at patient's stated goal  Outcome: Progressing Towards Goal  Goal: *Participates in infant care  Outcome: Progressing Towards Goal  Goal: *Demonstrates progressive activity  Outcome: Progressing Towards Goal  Goal: *Performs self perineal care  Outcome: Progressing Towards Goal  Goal: *Appropriate parent-infant bonding  Outcome: Progressing Towards Goal  Goal: *Tolerating diet  Outcome: Progressing Towards Goal  Goal: *Performs self breast care  Outcome: Progressing Towards Goal     Problem: Vaginal Delivery: Postpartum 2  Goal: Off Pathway (Use only if patient is Off Pathway)  Outcome: Progressing Towards Goal  Goal: Activity/Safety  Outcome: Progressing Towards Goal  Goal: Consults, if ordered  Outcome: Progressing Towards Goal  Goal: Nutrition/Diet  Outcome: Progressing Towards Goal  Goal: Discharge Planning  Outcome: Progressing Towards Goal  Goal: Medications  Outcome: Progressing Towards Goal  Goal: Treatments/Interventions/Procedures  Outcome: Progressing Towards Goal  Goal: Psychosocial  Outcome: Progressing Towards Goal     Problem: Vaginal Delivery: Discharge Outcomes  Goal: *Verbalizes name, dosage, time, side effects, and number of days to continue medications  Outcome: Progressing Towards Goal  Goal: *Describes available resources and support systems  Outcome: Progressing Towards Goal  Goal: *No signs and symptoms of infection  Outcome: Progressing Towards Goal  Goal: *Birth certificate information completed  Outcome: Progressing Towards Goal  Goal: *Received and verbalizes understanding of discharge plan and instructions  Outcome: Progressing Towards Goal  Goal: *Vital signs within defined limits  Outcome: Progressing Towards Goal  Goal: *Labs within defined limits  Outcome: Progressing Towards Goal  Goal: *Hemodynamically stable  Outcome: Progressing Towards Goal  Goal: *Optimal pain control at patient's stated goal  Outcome: Progressing Towards Goal  Goal: *Participates in infant care  Outcome: Progressing Towards Goal  Goal: *Demonstrates progressive activity  Outcome: Progressing Towards Goal  Goal: *Appropriate parent-infant bonding  Outcome: Progressing Towards Goal  Goal: *Tolerating diet  Outcome: Progressing Towards Goal     Problem: Pain  Goal: *Control of Pain  Outcome: Progressing Towards Goal  Goal: *PALLIATIVE CARE:  Alleviation of Pain  Outcome: Progressing Towards Goal     Problem: Patient Education: Go to Patient Education Activity  Goal: Patient/Family Education  Outcome: Progressing Towards Goal

## 2021-02-23 NOTE — PROGRESS NOTES
36 w arrived to unit c/o ctx. Pt taken to room  8, changed into gown and hooked to EFM. Pt c/o pain of 10/10. Pt denies  Bleeding or any distress. SVE 7-8 Buldging bag    0145:  Dr Jenkins Just paged for epidural requested by pt.     0147: Dr Taran Hair is on the way    0222:  Dr. Taran Hair @ bedside discussing epidural. Risk and benefits discussed with pt. Pt wished to proceed. Confirmation of signed consent form. Pt positioned at side of bed for procedure.      0230:  Timeout performed.      0235:  Test dose administered.      0236:  Catheter placed.      0238:  Loading dose administered. Fentanyl vial given to [employer] to be administered through epidural.    0242:  Pt lying back in bed. TOCO and US adjusted.      PCA pump started.     0303:  Pt BP ,  Ephedrine    0309:  Pt turned to left side, lates noted,     0318:  Lates resolved

## 2021-02-23 NOTE — OP NOTES
Postoperative Note    Patient: Devang Stephens  YOB: 1990  MRN: [id number]    Date of Procedure: 2021     Pre-Op Diagnosis: IUP at 40 weeks gestation,Fetal Intolerance of Labor, Primary  Section, Maternal obesity    Post-Op Diagnosis: Same as preoperative diagnosis. Procedure(s):   SECTION    Surgeon(s):  Anamaria Morrison MD    Surgical Assistant: None    Anesthesia: Epidural     Estimated Blood Loss (mL): 344 cc    Complications: None    Specimens:   ID Type Source Tests Collected by Time Destination   1 : Placenta Fresh Uterus  Anamaria Morrison MD 2021 1735 Pathology        Implants: * No implants in log *    Drains: * No LDAs found *    Findings: viable male infant, Apgar's 8 - 9, normal uterus, ovaries and tubes      Procedure:   Patient was taken to the OR after informed consent had been obtained. Spinal epidural was then placed. She was then placed in a dorsal supine position with a left lateral tilt. She was prepped and draped in a sterile fashion. Time out was completed. Attention was turned to the abdomen and an Allis test confirmed adequate anesthesia. A Pfannenstiel skin incision was made 3 cm above the symphysis pubis. The incision was carried down to the fascia. The fascia was opened in the midline. The subcutaneous tissue and fascia were extended bilaterally. The rectus muscle was divided bluntly. The peritoneum was entered. The bladder blade was inserted. The uterus was scored in the lower uterine segment and then entered in the midline. The uterine incision was extended bilaterally, transversly. The fetal head was flexed, pressure was applied to the abdomen and the fetal head was delivered followed by the body. The cord was clamped and cut. The placenta was manually extracted. The uterus was cleared of all clot and debris. The incision was closed with 0 Vicryl in a running fashion. A second imbricating layer of 0 Vicryl was placed. Figures-of-eight placed along uterine incision. Therese was applied. Hemostasis was noted. The fascia was closed in a running fashion. The subcutaneous layer was closed with 0 Vicryl running. The skin was closed with Ensorb dissolvable sutures. The counts were correct. The mother and child tolerated the procedure well.      Paul Kimble DO            Electronically Signed by Arsh Galaviz MD on 2/23/2021 at 5:41 PM

## 2021-02-23 NOTE — PROGRESS NOTES
Pushing stopped at this time due to lack of progress. POC at this time to position pt and then recheck once pt is feeling pressure.

## 2021-02-23 NOTE — ROUTINE PROCESS
Bedside and Verbal shift change report given to Geovanni Daley Rn (oncoming nurse) by Darleen Grande RN (offgoing nurse). Report included the following information SBAR, Kardex, Intake/Output, MAR, Recent Results and Med Rec.

## 2021-02-23 NOTE — PROGRESS NOTES
Nurse and CNM at bedside for pushing. Nurse and CNM remain at bedside continuously monitorin FHR tracing and Maternal effort continuously.

## 2021-02-23 NOTE — PROGRESS NOTES
Jade Pac, CNM at bedside for SVE and to practice push. CNM and nurse remain at bedside continuously monitoring FHR tracing and maternal status.

## 2021-02-23 NOTE — ANESTHESIA PREPROCEDURE EVALUATION
Relevant Problems   No relevant active problems       Anesthetic History   No history of anesthetic complications            Review of Systems / Medical History  Patient summary reviewed, nursing notes reviewed and pertinent labs reviewed    Pulmonary  Within defined limits                 Neuro/Psych   Within defined limits           Cardiovascular  Within defined limits                Exercise tolerance: >4 METS     GI/Hepatic/Renal  Within defined limits              Endo/Other        Morbid obesity     Other Findings              Physical Exam    Airway  Mallampati: III  TM Distance: 4 - 6 cm  Neck ROM: decreased range of motion   Mouth opening: Normal     Cardiovascular  Regular rate and rhythm,  S1 and S2 normal,  no murmur, click, rub, or gallop  Rhythm: regular  Rate: normal         Dental  No notable dental hx       Pulmonary  Breath sounds clear to auscultation               Abdominal  GI exam deferred       Other Findings            Anesthetic Plan    ASA: 3, emergent  Anesthesia type: epidural            Anesthetic plan and risks discussed with: Patient and Spouse      Risks of bleeding, infection, nerve injury, failed block, spinal tap causing headache and requiring epidural blood patch, back pain, and failed block discussed. Procedure described as elective. Pt understands and desires to proceed.   Dose existing epidural for C/S

## 2021-02-23 NOTE — PROGRESS NOTES
Problem: Patient Education: Go to Patient Education Activity  Goal: Patient/Family Education  Outcome: Progressing Towards Goal     Problem: Vaginal Delivery: Day of Deliver-Laboring  Goal: Off Pathway (Use only if patient is Off Pathway)  Outcome: Progressing Towards Goal  Goal: Activity/Safety  Outcome: Progressing Towards Goal  Goal: Consults, if ordered  Outcome: Progressing Towards Goal  Goal: Diagnostic Test/Procedures  Outcome: Progressing Towards Goal  Goal: Nutrition/Diet  Outcome: Progressing Towards Goal  Goal: Discharge Planning  Outcome: Progressing Towards Goal  Goal: Medications  Outcome: Progressing Towards Goal  Goal: Respiratory  Outcome: Progressing Towards Goal  Goal: Treatments/Interventions/Procedures  Outcome: Progressing Towards Goal  Goal: *Vital signs within defined limits  Outcome: Progressing Towards Goal  Goal: *Labs within defined limits  Outcome: Progressing Towards Goal  Goal: *Hemodynamically stable  Outcome: Progressing Towards Goal  Goal: *Optimal pain control at patient's stated goal  Outcome: Progressing Towards Goal     Problem: Pain  Goal: *Control of Pain  Outcome: Progressing Towards Goal  Goal: *PALLIATIVE CARE:  Alleviation of Pain  Outcome: Progressing Towards Goal     Problem: Patient Education: Go to Patient Education Activity  Goal: Patient/Family Education  Outcome: Progressing Towards Goal     Problem: Falls - Risk of  Goal: *Absence of Falls  Description: Document Aidan Fall Risk and appropriate interventions in the flowsheet.   Outcome: Progressing Towards Goal  Note: Fall Risk Interventions:                                Problem: Patient Education: Go to Patient Education Activity  Goal: Patient/Family Education  Outcome: Progressing Towards Goal

## 2021-02-23 NOTE — PROGRESS NOTES
Labor Progress Note  Patient seen, fetal heart rate and contraction pattern evaluated, patient examined. Patient Vitals for the past 1 hrs:    Physical Exam:  Cervical Exam: 9  Membranes:  Spontaneous Rupture of Membranes; Amniotic Fluid: clear fluid  Uterine Activity: Frequency: Every 5 minutes  Fetal Heart Rate: Reactive    Assessment/Plan:  Reassuring FHR. Pitocin ordered per protocol, Large amount of amnitioc fluid leaking, Repositioned in high fowlers position. pt feels pressure when laying on her side. Will anticipate vaginal delivery.      Sushila Carlin CNM

## 2021-02-24 LAB
HCT VFR BLD AUTO: 32.5 % (ref 35–45)
HGB BLD-MCNC: 10.4 G/DL (ref 12–16)

## 2021-02-24 PROCEDURE — 74011250637 HC RX REV CODE- 250/637: Performed by: ADVANCED PRACTICE MIDWIFE

## 2021-02-24 PROCEDURE — 65270000029 HC RM PRIVATE

## 2021-02-24 PROCEDURE — 85018 HEMOGLOBIN: CPT

## 2021-02-24 PROCEDURE — 85014 HEMATOCRIT: CPT

## 2021-02-24 PROCEDURE — 74011250636 HC RX REV CODE- 250/636: Performed by: ADVANCED PRACTICE MIDWIFE

## 2021-02-24 PROCEDURE — 36415 COLL VENOUS BLD VENIPUNCTURE: CPT

## 2021-02-24 RX ORDER — IBUPROFEN 400 MG/1
800 TABLET ORAL
Status: DISCONTINUED | OUTPATIENT
Start: 2021-02-25 | End: 2021-02-26 | Stop reason: HOSPADM

## 2021-02-24 RX ORDER — OXYCODONE AND ACETAMINOPHEN 5; 325 MG/1; MG/1
1-2 TABLET ORAL
Status: DISCONTINUED | OUTPATIENT
Start: 2021-02-24 | End: 2021-02-26 | Stop reason: HOSPADM

## 2021-02-24 RX ORDER — OXYCODONE AND ACETAMINOPHEN 5; 325 MG/1; MG/1
1-2 TABLET ORAL
Qty: 30 TAB | Refills: 0 | Status: SHIPPED | OUTPATIENT
Start: 2021-02-24 | End: 2021-02-26

## 2021-02-24 RX ORDER — IBUPROFEN 800 MG/1
800 TABLET ORAL
Qty: 90 TAB | Refills: 1 | Status: SHIPPED | OUTPATIENT
Start: 2021-02-25

## 2021-02-24 RX ADMIN — ACETAMINOPHEN 1000 MG: 500 TABLET ORAL at 15:05

## 2021-02-24 RX ADMIN — ACETAMINOPHEN 1000 MG: 500 TABLET ORAL at 03:20

## 2021-02-24 RX ADMIN — KETOROLAC TROMETHAMINE 30 MG: 30 INJECTION, SOLUTION INTRAMUSCULAR at 21:15

## 2021-02-24 RX ADMIN — KETOROLAC TROMETHAMINE 30 MG: 30 INJECTION, SOLUTION INTRAMUSCULAR at 09:28

## 2021-02-24 RX ADMIN — ACETAMINOPHEN 1000 MG: 500 TABLET ORAL at 21:15

## 2021-02-24 RX ADMIN — ACETAMINOPHEN 1000 MG: 500 TABLET ORAL at 09:28

## 2021-02-24 RX ADMIN — KETOROLAC TROMETHAMINE 30 MG: 30 INJECTION, SOLUTION INTRAMUSCULAR at 03:20

## 2021-02-24 RX ADMIN — KETOROLAC TROMETHAMINE 30 MG: 30 INJECTION, SOLUTION INTRAMUSCULAR at 15:03

## 2021-02-24 NOTE — PROGRESS NOTES
Problem: Patient Education: Go to Patient Education Activity  Goal: Patient/Family Education  Outcome: Progressing Towards Goal     Problem: Pain  Goal: *Control of Pain  Outcome: Progressing Towards Goal     Problem: Patient Education: Go to Patient Education Activity  Goal: Patient/Family Education  Outcome: Progressing Towards Goal     Problem: Falls - Risk of  Goal: *Absence of Falls  Description: Document Namna Jack Fall Risk and appropriate interventions in the flowsheet.   Outcome: Progressing Towards Goal  Note: Fall Risk Interventions:                                Problem: Patient Education: Go to Patient Education Activity  Goal: Patient/Family Education  Outcome: Progressing Towards Goal     Problem: Lactation Care Plan  Goal: *Infant latching appropriately  Outcome: Progressing Towards Goal  Goal: *Weight loss less than 10% of birth weight  Outcome: Progressing Towards Goal     Problem: Patient Education: Go to Patient Education Activity  Goal: Patient/Family Education  Outcome: Progressing Towards Goal     Problem:  Delivery: Day of Delivery  Goal: Activity/Safety  Outcome: Progressing Towards Goal  Goal: Consults, if ordered  Outcome: Progressing Towards Goal  Goal: Diagnostic Test/Procedures  Outcome: Progressing Towards Goal  Goal: Nutrition/Diet  Outcome: Progressing Towards Goal  Goal: Discharge Planning  Outcome: Progressing Towards Goal  Goal: Medications  Outcome: Progressing Towards Goal  Goal: Respiratory  Outcome: Progressing Towards Goal  Goal: Treatments/Interventions/Procedures  Outcome: Progressing Towards Goal  Goal: Psychosocial  Outcome: Progressing Towards Goal  Goal: *Vital signs within defined limits  Outcome: Progressing Towards Goal  Goal: *Labs within defined limits  Outcome: Progressing Towards Goal  Goal: *Hemodynamically stable  Outcome: Progressing Towards Goal  Goal: *Optimal pain control at patient's stated goal  Outcome: Progressing Towards Goal  Goal: *Participates in infant care  Outcome: Progressing Towards Goal  Goal: *Demonstrates progressive activity  Outcome: Progressing Towards Goal  Goal: *Tolerating diet  Outcome: Progressing Towards Goal     Problem: Pressure Injury - Risk of  Goal: *Prevention of pressure injury  Description: Document Steven Scale and appropriate interventions in the flowsheet. Outcome: Progressing Towards Goal  Note: Pressure Injury Interventions:             Activity Interventions: Pressure redistribution bed/mattress(bed type)    Mobility Interventions: Pressure redistribution bed/mattress (bed type)    Nutrition Interventions: Offer support with meals,snacks and hydration                     Problem: Patient Education: Go to Patient Education Activity  Goal: Patient/Family Education  Outcome: Progressing Towards Goal

## 2021-02-24 NOTE — PROGRESS NOTES
Problem: Patient Education: Go to Patient Education Activity  Goal: Patient/Family Education  Outcome: Progressing Towards Goal     Problem: Pain  Goal: *Control of Pain  Outcome: Progressing Towards Goal  Goal: *PALLIATIVE CARE:  Alleviation of Pain  Outcome: Progressing Towards Goal     Problem: Patient Education: Go to Patient Education Activity  Goal: Patient/Family Education  Outcome: Progressing Towards Goal     Problem: Falls - Risk of  Goal: *Absence of Falls  Description: Document Aidan Fall Risk and appropriate interventions in the flowsheet.  Outcome: Progressing Towards Goal  Note: Fall Risk Interventions:                                Problem: Patient Education: Go to Patient Education Activity  Goal: Patient/Family Education  Outcome: Progressing Towards Goal     Problem:  Delivery: Day of Delivery  Goal: Activity/Safety  Outcome: Progressing Towards Goal  Goal: Consults, if ordered  Outcome: Progressing Towards Goal  Goal: Diagnostic Test/Procedures  Outcome: Progressing Towards Goal  Goal: Nutrition/Diet  Outcome: Progressing Towards Goal  Goal: Discharge Planning  Outcome: Progressing Towards Goal  Goal: Medications  Outcome: Progressing Towards Goal  Goal: Respiratory  Outcome: Progressing Towards Goal  Goal: Treatments/Interventions/Procedures  Outcome: Progressing Towards Goal  Goal: Psychosocial  Outcome: Progressing Towards Goal  Goal: *Vital signs within defined limits  Outcome: Progressing Towards Goal  Goal: *Labs within defined limits  Outcome: Progressing Towards Goal  Goal: *Hemodynamically stable  Outcome: Progressing Towards Goal  Goal: *Optimal pain control at patient's stated goal  Outcome: Progressing Towards Goal  Goal: *Participates in infant care  Outcome: Progressing Towards Goal  Goal: *Demonstrates progressive activity  Outcome: Progressing Towards Goal  Goal: *Tolerating diet  Outcome: Progressing Towards Goal     Problem: Patient Education: Go to Patient Education  Activity  Goal: Patient/Family Education  Outcome: Progressing Towards Goal

## 2021-02-24 NOTE — PROGRESS NOTES
Labor Progress Note  Patient seen, fetal heart rate and contraction pattern evaluated, patient examined. Pt comfortable with epidural but feeling more vaginal pressure with uc's. Patient Vitals for the past 1 hrs:   BP Pulse SpO2   21 1832 (!) 116/58 96    21 1831   98 %   21 1827 (!) 113/57 99    21 1826   98 %   21 1822 (!) 116/57 85    21 1821   98 %   21 1817 123/63 87    21 1816   97 %   21 1811   97 %   21 1809 (!) 110/58 93    21 1806   97 %       Physical Exam:  Cervical Exam:  9 with anterior lip swelling, -3  Membranes:  leaking clear fluid  Uterine Activity: Frequency: Every 3 minutes  Fetal Heart Rate: variability moderate by having tachycardia. Maternal temp 99.3. Explained to pt that position changes and Pitocin were not helping to increase dilation or with fetal descent. Because she has had multiple SVE's we would attempt pushing with contractions to see if cervix fetal position would change in order to also continue to dilate her cervix. Multiple pushes attempted with no change in cervix or fetal descent. Able to change baby's head position in pelvis. With exam, able to feel all around fetal head and neck. Still not change in exam after rotating fetal head. Dr. Tayo Livingston notified of pt status. States he will come and evaluate. Assessment/Plan:  Fetal tachycardia, low grade maternal temp, failure for fetal head to descend. Dr. Tayo Livingston to come and assess and to discuss primary  section with pt.    Chucky Nails CNM

## 2021-02-24 NOTE — LACTATION NOTE
This note was copied from a baby's chart. 1819 mom in shower at this time. Per FOB,  has been feeding well, and output is WNL. No questions at this time. Will return.

## 2021-02-24 NOTE — PROGRESS NOTES
Problem: Pain  Goal: *Control of Pain  2021 by KOBE Tenorio  Outcome: Progressing Towards Goal  2021 by KOBE Tenorio  Outcome: Progressing Towards Goal     Problem:  Delivery: Day of Delivery  Goal: Activity/Safety  Outcome: Progressing Towards Goal  Goal: Consults, if ordered  Outcome: Progressing Towards Goal  Goal: Diagnostic Test/Procedures  Outcome: Progressing Towards Goal  Goal: Nutrition/Diet  Outcome: Progressing Towards Goal  Goal: Discharge Planning  Outcome: Progressing Towards Goal  Goal: Medications  Outcome: Progressing Towards Goal  Goal: Respiratory  Outcome: Progressing Towards Goal  Goal: Treatments/Interventions/Procedures  Outcome: Progressing Towards Goal  Goal: Psychosocial  Outcome: Progressing Towards Goal  Goal: *Vital signs within defined limits  Outcome: Progressing Towards Goal  Goal: *Labs within defined limits  Outcome: Progressing Towards Goal  Goal: *Hemodynamically stable  Outcome: Progressing Towards Goal  Goal: *Optimal pain control at patient's stated goal  Outcome: Progressing Towards Goal  Goal: *Participates in infant care  Outcome: Progressing Towards Goal  Goal: *Demonstrates progressive activity  Outcome: Progressing Towards Goal  Goal: *Tolerating diet  Outcome: Progressing Towards Goal

## 2021-02-24 NOTE — PROGRESS NOTES
2125- TRANSFER - IN REPORT:    Verbal report received from BRENNON Payne RN(name) on [employer]  being received from L&D(unit) for routine progression of care      Report consisted of patients Situation, Background, Assessment and   Recommendations(SBAR). Information from the following report(s) SBAR, Intake/Output, MAR and Recent Results was reviewed with the receiving nurse. Opportunity for questions and clarification was provided. Assessment completed upon patients arrival to unit and care assumed. 2135- Assessment performed without complications. VSS. Pt in no apparent distress or discomfort. 2250- Rounded on pt, laying comfortably in bed holding infant. No needs expressed at this time. Bed in lowest position, call bell in reach. 2335- Rounded on pt, laying comfortably in bed. Pt respirations, pulse ox, and POSS scale taken at this time. Pt denies pain or discomfort. 0100- Rounded on pt, laying comfortably in bed. No needs expressed at this time. Bed in lowest position, call bell in reach. 0320- Pt rates pain 2/10. Scheduled Tylenol and Toradol given without complications. 2783- Pt urine output equates to 28mL/hour. Urine anselmo in color. Cartwright catheter not removed at this time. Pt instructed to increase oral fluid intake. Pt verbalized understanding. 0515- Rounded on pt, laying comfortably in bed. No needs expressed at this time. Bed in lowest position, call bell in reach. 0709- Cartwright catheter removed without complications. Pt ambulated to and from bathroom with assistance, no gait disturbance noted. Yudy care performed, bed linen changed at this time. 0715- Bedside and Verbal shift change report given to CHERELLE Pineda LPN (oncoming nurse) by Salvador Pimentel RN (offgoing nurse). Report included the following information SBAR, Intake/Output, MAR and Recent Results.

## 2021-02-24 NOTE — ANESTHESIA POSTPROCEDURE EVALUATION
Post-Anesthesia Evaluation and Assessment    Cardiovascular Function/Vital Signs  Visit Vitals  BP (!) 105/56   Pulse 97   Temp 36.8 °C (98.3 °F)   Resp 18   Ht 5' (1.524 m)   Wt 115.7 kg (255 lb)   SpO2 99%   Breastfeeding Unknown   BMI 49.80 kg/m²       Patient is status post Procedure(s):   SECTION. Nausea/Vomiting: Controlled. Postoperative hydration reviewed and adequate. Pain:  Pain Scale 1: Numeric (0 - 10) (21)  Pain Intensity 1: 0 (21)   Managed. Neurological Status:   Neuro (WDL): Within Defined Limits (21)   At baseline. Mental Status and Level of Consciousness: Arousable. Pulmonary Status:   O2 Device: Room air (21)   Adequate oxygenation and airway patent. Complications related to anesthesia: None    Post-anesthesia assessment completed. No concerns. Patient has met all discharge requirements.     Signed By: Aurora Verde CRNA    2021

## 2021-02-24 NOTE — PROGRESS NOTES
1915 - Bedside and Verbal shift change report given to SIERRA Mederos RN (oncoming nurse) by CHETAN Berrios RN (offgoing nurse). Report included the following information SBAR, Kardex, Intake/Output, MAR and Recent Results. 4406 - Assessment complete. POC discussed. Patient resting with baby at bedside, call light within reach. 2015 - Yudy care performed. Bed pads changed. Patient tolerated well. 2110 - TRANSFER - OUT REPORT:    2122 - Verbal report given to JULIA Osuna RN(name) on [employer]  being transferred to [facility] (unit) for routine progression of care       Report consisted of patients Situation, Background, Assessment and   Recommendations(SBAR). Information from the following report(s) SBAR, Kardex, Intake/Output, MAR and Recent Results was reviewed with the receiving nurse. Opportunity for questions and clarification was provided.

## 2021-02-25 PROCEDURE — 65270000029 HC RM PRIVATE

## 2021-02-25 PROCEDURE — 74011250637 HC RX REV CODE- 250/637: Performed by: OBSTETRICS & GYNECOLOGY

## 2021-02-25 PROCEDURE — 74011250637 HC RX REV CODE- 250/637: Performed by: ADVANCED PRACTICE MIDWIFE

## 2021-02-25 RX ADMIN — IBUPROFEN 800 MG: 400 TABLET, FILM COATED ORAL at 13:21

## 2021-02-25 RX ADMIN — ACETAMINOPHEN 1000 MG: 500 TABLET ORAL at 21:34

## 2021-02-25 RX ADMIN — ACETAMINOPHEN 1000 MG: 500 TABLET ORAL at 15:57

## 2021-02-25 NOTE — PROGRESS NOTES
Progress Note    Patient: Vianca Goins MRN: [id number]     YOB: 1990  Age: 26 y.o. Subjective:     Post-Operative Day: 1    The patient is feeling well. Pain is  well controlled with current medications. Urinary output is adequate. Cartwright has been discontinued. The patient is ambulating well and is tolerating a regular diet. Pt is passing flatus. Baby is feeding via bottle without difficulty. Objective:      Patient Vitals for the past 12 hrs:   Temp Pulse Resp BP SpO2   02/24/21 1505 97.5 °F (36.4 °C) 70 16 (!) 98/53 100 %   02/24/21 1100 97.7 °F (36.5 °C) 72 16 115/70 100 %       General:    alert, cooperative, no distress   Bowel Sounds:  active   Lochia:  appropriate   Uterine Fundus:   firm @ umbilicus    Incision:  Dry & Intact    DVT Evaluation:  No evidence of DVT seen on physical exam.  Negative Juanjose's sign. Lab/Data Review:  Recent Results (from the past 24 hour(s))   HEMATOCRIT    Collection Time: 02/24/21  3:15 AM   Result Value Ref Range    HCT 32.5 (L) 35.0 - 45.0 %   HEMOGLOBIN    Collection Time: 02/24/21  3:15 AM   Result Value Ref Range    HGB 10.4 (L) 12.0 - 16.0 g/dL     All lab results for the last 24 hours reviewed. Assessment:     Delivery: spontaneous vaginal delivery    Plan:     Doing well postpartum vaginal delivery. Continue current postpartum care. Encouraged hydration, nutrition and ambulation. Transition to PO medications. DC home tomorrow.       Signed By: Jodie Agosto CNM     February 24, 2021

## 2021-02-25 NOTE — DISCHARGE SUMMARY
Obstetrical Discharge Summary     Name: Aldo Tello MRN: [id number]  SSN: [id number]    YOB: 1990  Age: 26 y.o. Sex: female      Allergies: Patient has no known allergies. Admit Date: 2021    Discharge Date: 2021     Admitting Physician: Jimbo Cobb MD     Attending Physician:  Keesha Garrido MD     * Admission Diagnoses: IUP (intrauterine pregnancy), incidental [Z33.1]    * Discharge Diagnoses:   Information for the patient's :  Crystal Moyer [[id number]]   Delivery of a 3.74 kg male infant via , Low Transverse on 2021 at 5:14 PM  by Lashon Will. Apgars were 8  and 9 . Additional Diagnoses:   Hospital Problems as of 2021 Never Reviewed          Codes Class Noted - Resolved POA    IUP (intrauterine pregnancy), incidental ICD-10-CM: Z33.1  ICD-9-CM: V22.2  2021 - Present Unknown             Lab Results   Component Value Date/Time    ABO/Rh(D) B POSITIVE 2021 01:10 AM    There is no immunization history for the selected administration types on file for this patient. * Procedures: 1LTCS  Procedure(s):   SECTION           * Discharge Condition: good    [facility] Course: Normal hospital course following the delivery. * Disposition: Home    Discharge Medications:   Current Discharge Medication List      START taking these medications    Details   ibuprofen (MOTRIN) 800 mg tablet Take 1 Tab by mouth every eight (8) hours as needed for Pain. Indications: pain  Qty: 90 Tab, Refills: 1      oxyCODONE-acetaminophen (PERCOCET) 5-325 mg per tablet Take 1-2 Tabs by mouth every four (4) hours as needed for Pain for up to 7 days. Max Daily Amount: 12 Tabs. Qty: 30 Tab, Refills: 0    Associated Diagnoses: IUP (intrauterine pregnancy), incidental         CONTINUE these medications which have NOT CHANGED    Details   PNV Comb #2-Iron-FA-Omega 3 29-1-400 mg cmpk Take  by mouth.       B.infantis-B.ani-B.long-B.bifi (Probiotic 4X) 10-15 mg TbEC Take  by mouth. * Follow-up Care/Patient Instructions:   Activity: Activity as tolerated  Diet: Regular Diet  Wound Care: Keep wound clean and dry    Follow-up Information     Follow up With Specialties Details Why Contact Info    Fani Del Castillo MD Family Medicine   [facility] [address] [id number] [phone]

## 2021-02-25 NOTE — PROGRESS NOTES
Name:  Everton Bledsoeping  Age:  26 y.o. MRN:  [id number]  CSN:  [id number]  :  1990    2021  8:41 AM    Anesthesia Epidural Duramorph Rounds Note    Referring physician: Jayden Costa MD   Patient status post Procedure(s):   SECTION on 2021    POST OP Day # 2    Visit Vitals  /66 (BP 1 Location: Left lower arm, BP Patient Position: At rest;Lying right side)   Pulse 70   Temp 36.5 °C (97.7 °F)   Resp 18   Ht 5' (1.524 m)   Wt 115.7 kg (255 lb)   SpO2 98%   Breastfeeding Unknown   BMI 49.80 kg/m²          Patient rates pain 1 at rest and 2 with movement. Pain is subjectively rated by patient as mild. Pain location: abdomen    No complications noted, adequate analgesia from Epidural Duramorph. Continue current postop pain regimen.     Noble Johns MD

## 2021-02-25 NOTE — DISCHARGE INSTRUCTIONS
POST DELIVERY DISCHARGE INSTRUCTIONS    Name: Jens Clark  YOB: 1990  Primary Diagnosis: Active Problems:    IUP (intrauterine pregnancy), incidental (2/23/2021)        General:     Diet/Diet Restrictions:  Eight 8-ounce glasses of fluid daily (water, juices); avoid excessive caffeine intake. Meals/snacks as desired which are high in fiber and carbohydrates and low in fat and cholesterol. Physical Activity / Restrictions / Safety:     Avoid heavy lifting, no more than the baby alone (not the baby in the car seat). Avoid intercourse until you are seen at your postpartum visit. No douching or tampon use. Check with obstetrician before starting or resuming an exercise program.         Discharge Instructions/Special Treatment/Home Care Needs:     Continue prenatal vitamins. Continue to use squirt bottle with warm water on your perineum after each bathroom use until bleeding stops. Call your doctor for the following:     Fever over 101 degrees by mouth. Vaginal bleeding that soaks two pads per hour for more than one hour. Red streaks or increased swelling of legs, painful red streaks on your breast.  If you feel extremely anxious or overwhelmed. If you have thoughts of harming yourself and/or your baby. Pain Management:     Pain Management:   Take Acetaminophen (Tylenol) or Ibuprofen (Advil, Motrin), as directed for pain. Use a warm Sitz bath 3 times daily to relieve perineal or hemorrhoidal discomfort. For hemorrhoidal discomfort, use Tucks and Anusol cream as needed and directed.     Follow-Up Care:     Appointment with MD:   Follow-up Appointments   Procedures    FOLLOW UP VISIT Appointment in: 6 Weeks     Standing Status:   Standing     Number of Occurrences:   1     Order Specific Question:   Appointment in     Answer:   Johnnie Bustos     Telephone number: [phone]      Signed By: Cruzito Delgado CNM

## 2021-02-25 NOTE — PROGRESS NOTES
Progress Note                               Patient: Hoa Viera MRN: [id number]  SSN: [id number]    YOB: 1990  Age: 26 y.o. Sex: female      2 Days Post-Op     Subjective:     Patient doing well postpartum without significant complaints. Voiding without difficulty. Patient reports normal lochia. . Breastfeeding: Yes     Objective:     Patient Vitals for the past 18 hrs:   Temp Pulse Resp BP SpO2   21 0844 97.5 °F (36.4 °C) 80 17 (!) 119/58 97 %   21 2310 97.7 °F (36.5 °C) 70 18 108/66 98 %       Temp (24hrs), Av.6 °F (36.4 °C), Min:97.5 °F (36.4 °C), Max:97.7 °F (36.5 °C)      Physical Exam:    Patient without distress. Lung: normal respiratory effort  Abdomen: soft, nontender  Incision: healing well  Fundus: firm and non tender     Lab/Data Review: All lab results for the last 24 hours reviewed. Assessment and Plan:     Patient appears to be having an uncomplicated post- course. Continue routine postoperative care and maternal education. Discharge pending infant status. Discharge in am if infant is not able to go home today.        Heather Telles CNM

## 2021-02-25 NOTE — PROGRESS NOTES
Problem: Patient Education: Go to Patient Education Activity  Goal: Patient/Family Education  Outcome: Progressing Towards Goal     Problem: Pain  Goal: *Control of Pain  Outcome: Progressing Towards Goal     Problem: Patient Education: Go to Patient Education Activity  Goal: Patient/Family Education  Outcome: Progressing Towards Goal     Problem: Falls - Risk of  Goal: *Absence of Falls  Description: Document Wendy Good Fall Risk and appropriate interventions in the flowsheet.   Outcome: Progressing Towards Goal  Note: Fall Risk Interventions:            Medication Interventions: Teach patient to arise slowly                   Problem: Patient Education: Go to Patient Education Activity  Goal: Patient/Family Education  Outcome: Progressing Towards Goal     Problem:  Delivery: Postpartum Day 1  Goal: Activity/Safety  Outcome: Progressing Towards Goal  Goal: Consults, if ordered  Outcome: Progressing Towards Goal  Goal: Diagnostic Test/Procedures  Outcome: Progressing Towards Goal  Goal: Nutrition/Diet  Outcome: Progressing Towards Goal  Goal: Discharge Planning  Outcome: Progressing Towards Goal  Goal: Medications  Outcome: Progressing Towards Goal  Goal: Respiratory  Outcome: Progressing Towards Goal  Goal: Treatments/Interventions/Procedures  Outcome: Progressing Towards Goal  Goal: Psychosocial  Outcome: Progressing Towards Goal  Goal: *Vital signs within defined limits  Outcome: Progressing Towards Goal  Goal: *Labs within defined limits  Outcome: Progressing Towards Goal  Goal: *Hemodynamically stable  Outcome: Progressing Towards Goal  Goal: *Optimal pain control at patient's stated goal  Outcome: Progressing Towards Goal  Goal: *Participates in infant care  Outcome: Progressing Towards Goal  Goal: *Demonstrates progressive activity  Outcome: Progressing Towards Goal  Goal: *Tolerating diet  Outcome: Progressing Towards Goal  Goal: *Performs self perineal care  Outcome: Progressing Towards Goal

## 2021-02-25 NOTE — LACTATION NOTE
Set mom up with double electric breast pump and educated on how to use initiation mode, pump hygiene, and safe milk storage due to infant having increased respirations due to possibly being hungry and to increase supply. Mom to pump q 3 hours for 15 minutes on initiation mode after breastfeeding. Mom verbalized understanding and no questions at this time.

## 2021-02-25 NOTE — PROGRESS NOTES
1915- Bedside and Verbal shift change report given to Kristina Sheikh RN (oncoming nurse) by Lyndsay Dee LPN (offgoing nurse). Report included the following information SBAR, Intake/Output, MAR and Recent Results. 2115- Assessment performed without complications. Pt rates pain 2/10. Scheduled Toradol and Tylenol given without complications. Pt complaining of itching and discomfort at IV site. No infiltration or phlebitis noted. IV removed without complications. 2235- Pt resting comfortably in bed with eyes closed. Chest expansion noted. Bed in lowest position, call bell within reach. 2320- Rounded on pt, laying comfortably in bed breastfeeding infant. No needs expressed at this time. Bed in lowest position, call bell in reach. 0040- Pt resting comfortably in bed with eyes closed. Chest expansion noted. Bed in lowest position, call bell within reach. 0140- Rounded on pt, laying comfortably in bed holding infant. No needs expressed at this time. Bed in lowest position, call bell in reach. 0320- Rounded on pt, laying comfortably in bed. Scheduled Tylenol held at this time due to cumulative dose warning of exceeding 4,000mg of acetaminophen within 24 hours. Pt states having no pain at this time and is comfortable withholding Tylenol at this time. 0600- Pt resting comfortably in bed with eyes closed. Chest expansion noted. Bed in lowest position, call bell within reach. 0730- Bedside and Verbal shift change report given to Courtney Chavez RN (oncoming nurse) by Kristina Sheikh RN (offgoing nurse). Report included the following information SBAR, Intake/Output, MAR and Recent Results.

## 2021-02-25 NOTE — PROGRESS NOTES
0757 Bedside and Verbal shift change report given to SANKET Rudd RN and CHETAN BARRAGAN (oncoming nurse) by Michelle Cervantes RN (offgoing nurse). Report included the following information SBAR, Kardex, OR Summary, Procedure Summary, Intake/Output, MAR and Recent Results. 6947 Shift assessment complete. Patient denies headache, visual disturbances, and right epigastic pain at this time. Breath sounds clear bilaterally. Patient is passing gas, bowel sounds active, with last BM being . Fundus firm at U-1 with scant lochia, no clots noted on assessment. Incision ROSITA. Incentive spirometer used at this time and patient educated to continue using 10 times every hour. Cartwright catheter out. IV site out. Trace edema in upper extremities, 1+ non-pitting in lower extremities. Patient free of clonus in lower extremities and denying any pain/tenderness behind knees or in calves. Patient rating pain 0/10 and no pain medication at this time. Patient educated to notify nursing staff of: SOB, chest pain/heaviness, blurred vision, lightheadedness, increase in bleeding, and passing of clots, patient verbalized understanding. 1115 Patient nursing     200 Patient informed of  CBC, patient verbalized understanding. 1321 2 tab motrin given    1410 MD ordered supplementation for infant due to tachypnea and constant rooting. Encouraged mom to feed infant and set mom up with breast pump and instructed on pumping after each breastfeeding. Mom to supplement infant with expressed breast milk with a syringe or spoon--education completed on risks of using bottle nipple to supplement breastfeeding baby. If unable to express colostrum, MD orders supplemental formula to be given with syringe, spoon, or method of mother's choice. Mom verbalized understanding and no questions at this time.   Formula provided to mom and educated on infant's stomach size, WNL volume intake in , how to safely prepare formula, bottle hygiene, appropriate way to feed infant with bottle, and burping techniques. Handout given for reinforcement. Mom verbalized understanding and no questions at this time    1557 Reassessment complete and scheduled tylenol given. Patient has no needs or concerns at this time. 1655 Pain reassessed, patient has no needs or concerns at this time. 1810 Patient has no needs or concerns at this time    1920 Bedside and Verbal shift change report given to JULIA Osuna RN (oncoming nurse) by Bryanna Rudd RN and CHETAN BARRAGAN (offgoing nurse). Report included the following information SBAR, Kardex, OR Summary, Procedure Summary, Intake/Output, MAR and Recent Results.

## 2021-02-25 NOTE — PROGRESS NOTES
Problem: Patient Education: Go to Patient Education Activity  Goal: Patient/Family Education  2/24/2021 1946 by Kylie Osuna RN  Outcome: Progressing Towards Goal  2/24/2021 1945 by Kylie Osuna RN  Outcome: Progressing Towards Goal     Problem: Pain  Goal: *Control of Pain  2/24/2021 1946 by Kylie Osuna RN  Outcome: Progressing Towards Goal  2/24/2021 1945 by Kylie Osuna RN  Outcome: Progressing Towards Goal     Problem: Patient Education: Go to Patient Education Activity  Goal: Patient/Family Education  2/24/2021 1946 by Kylie Osuna RN  Outcome: Progressing Towards Goal  2/24/2021 1945 by Kylie Osuna RN  Outcome: Progressing Towards Goal     Problem: Falls - Risk of  Goal: *Absence of Falls  Description: Document Aidan Fall Risk and appropriate interventions in the flowsheet.  2/24/2021 1946 by Kylie Osuna RN  Outcome: Progressing Towards Goal  Note: Fall Risk Interventions:                             2/24/2021 1945 by Kylie Osuna RN  Outcome: Progressing Towards Goal  Note: Fall Risk Interventions:                                Problem: Patient Education: Go to Patient Education Activity  Goal: Patient/Family Education  2/24/2021 1946 by Kylie Osuna RN  Outcome: Progressing Towards Goal  2/24/2021 1945 by Kylie Osuna RN  Outcome: Progressing Towards Goal     Problem: Lactation Care Plan  Goal: *Infant latching appropriately  2/24/2021 1946 by Kylie Osuna RN  Outcome: Progressing Towards Goal  2/24/2021 1945 by Kylie Osuna RN  Outcome: Progressing Towards Goal  Goal: *Weight loss less than 10% of birth weight  2/24/2021 1946 by Kylie Osuna RN  Outcome: Progressing Towards Goal  2/24/2021 1945 by Kylie Osuna RN  Outcome: Progressing Towards Goal     Problem: Patient Education: Go to Patient Education Activity  Goal: Patient/Family Education  2/24/2021 1946 by Kylie Osuna RN  Outcome: Progressing Towards Goal  2/24/2021 1945 by Kylie Osuna  RN  Outcome: Progressing Towards Goal     Problem: Pressure Injury - Risk of  Goal: *Prevention of pressure injury  Description: Document Steven Scale and appropriate interventions in the flowsheet. 2021 by Cary Campos RN  Outcome: Progressing Towards Goal  2021 by Cary Campos RN  Outcome: Progressing Towards Goal  Note: Pressure Injury Interventions:             Activity Interventions: Pressure redistribution bed/mattress(bed type)    Mobility Interventions: Pressure redistribution bed/mattress (bed type)    Nutrition Interventions: Offer support with meals,snacks and hydration                     Problem: Patient Education: Go to Patient Education Activity  Goal: Patient/Family Education  2021 by Cary Campos RN  Outcome: Progressing Towards Goal  2021 by Cary Campos RN  Outcome: Progressing Towards Goal     Problem:  Delivery: Postpartum Day 1  Goal: Off Pathway (Use only if patient is Off Pathway)  Outcome: Progressing Towards Goal  Goal: Activity/Safety  Outcome: Progressing Towards Goal  Goal: Consults, if ordered  Outcome: Progressing Towards Goal  Goal: Diagnostic Test/Procedures  Outcome: Progressing Towards Goal  Goal: Nutrition/Diet  Outcome: Progressing Towards Goal  Goal: Discharge Planning  Outcome: Progressing Towards Goal  Goal: Medications  Outcome: Progressing Towards Goal  Goal: Respiratory  Outcome: Progressing Towards Goal  Goal: Treatments/Interventions/Procedures  Outcome: Progressing Towards Goal  Goal: Psychosocial  Outcome: Progressing Towards Goal  Goal: *Vital signs within defined limits  Outcome: Progressing Towards Goal  Goal: *Labs within defined limits  Outcome: Progressing Towards Goal  Goal: *Hemodynamically stable  Outcome: Progressing Towards Goal  Goal: *Optimal pain control at patient's stated goal  Outcome: Progressing Towards Goal  Goal: *Participates in infant care  Outcome: Progressing Towards Goal  Goal: *Demonstrates progressive activity  Outcome: Progressing Towards Goal  Goal: *Tolerating diet  Outcome: Progressing Towards Goal  Goal: *Performs self perineal care  Outcome: Progressing Towards Goal

## 2021-02-26 VITALS
WEIGHT: 255 LBS | SYSTOLIC BLOOD PRESSURE: 138 MMHG | RESPIRATION RATE: 17 BRPM | TEMPERATURE: 97.9 F | OXYGEN SATURATION: 98 % | DIASTOLIC BLOOD PRESSURE: 81 MMHG | BODY MASS INDEX: 50.06 KG/M2 | HEIGHT: 60 IN | HEART RATE: 68 BPM

## 2021-02-26 PROCEDURE — 74011250637 HC RX REV CODE- 250/637: Performed by: ADVANCED PRACTICE MIDWIFE

## 2021-02-26 RX ORDER — OXYCODONE AND ACETAMINOPHEN 5; 325 MG/1; MG/1
1-2 TABLET ORAL
Qty: 20 TAB | Refills: 0 | Status: SHIPPED | OUTPATIENT
Start: 2021-02-26 | End: 2021-03-05

## 2021-02-26 RX ADMIN — ACETAMINOPHEN 1000 MG: 500 TABLET ORAL at 08:33

## 2021-02-26 NOTE — PROGRESS NOTES
Problem: Pain  Goal: *Control of Pain  Outcome: Progressing Towards Goal     Problem: Patient Education: Go to Patient Education Activity  Goal: Patient/Family Education  Outcome: Progressing Towards Goal     Problem: Falls - Risk of  Goal: *Absence of Falls  Description: Document Thora Bare Fall Risk and appropriate interventions in the flowsheet.   Outcome: Progressing Towards Goal  Note: Fall Risk Interventions:            Medication Interventions: Teach patient to arise slowly    Elimination Interventions: Call light in reach              Problem: Patient Education: Go to Patient Education Activity  Goal: Patient/Family Education  Outcome: Progressing Towards Goal     Problem: Lactation Care Plan  Goal: *Infant latching appropriately  Outcome: Progressing Towards Goal  Goal: *Weight loss less than 10% of birth weight  Outcome: Progressing Towards Goal     Problem: Patient Education: Go to Patient Education Activity  Goal: Patient/Family Education  Outcome: Progressing Towards Goal     Problem:  Delivery: Postpartum Day 1  Goal: Activity/Safety  Outcome: Progressing Towards Goal  Goal: Consults, if ordered  Outcome: Progressing Towards Goal  Goal: Diagnostic Test/Procedures  Outcome: Progressing Towards Goal  Goal: Nutrition/Diet  Outcome: Progressing Towards Goal  Goal: Discharge Planning  Outcome: Progressing Towards Goal  Goal: Medications  Outcome: Progressing Towards Goal  Goal: Respiratory  Outcome: Progressing Towards Goal  Goal: Treatments/Interventions/Procedures  Outcome: Progressing Towards Goal  Goal: Psychosocial  Outcome: Progressing Towards Goal  Goal: *Vital signs within defined limits  Outcome: Progressing Towards Goal  Goal: *Labs within defined limits  Outcome: Progressing Towards Goal  Goal: *Hemodynamically stable  Outcome: Progressing Towards Goal  Goal: *Optimal pain control at patient's stated goal  Outcome: Progressing Towards Goal  Goal: *Participates in infant care  Outcome: Progressing Towards Goal  Goal: *Demonstrates progressive activity  Outcome: Progressing Towards Goal  Goal: *Tolerating diet  Outcome: Progressing Towards Goal  Goal: *Performs self perineal care  Outcome: Progressing Towards Goal     Problem: Patient Education: Go to Patient Education Activity  Goal: Patient/Family Education  Outcome: Progressing Towards Goal     Problem:  Delivery: Day of Delivery  Goal: Off Pathway (Use only if patient is Off Pathway)  Outcome: Progressing Towards Goal  Goal: Activity/Safety  Outcome: Progressing Towards Goal  Goal: Consults, if ordered  Outcome: Progressing Towards Goal  Goal: Diagnostic Test/Procedures  Outcome: Progressing Towards Goal  Goal: Nutrition/Diet  Outcome: Progressing Towards Goal  Goal: Discharge Planning  Outcome: Progressing Towards Goal  Goal: Medications  Outcome: Progressing Towards Goal  Goal: Respiratory  Outcome: Progressing Towards Goal  Goal: Treatments/Interventions/Procedures  Outcome: Progressing Towards Goal  Goal: Psychosocial  Outcome: Progressing Towards Goal  Goal: *Vital signs within defined limits  Outcome: Progressing Towards Goal  Goal: *Labs within defined limits  Outcome: Progressing Towards Goal  Goal: *Hemodynamically stable  Outcome: Progressing Towards Goal  Goal: *Optimal pain control at patient's stated goal  Outcome: Progressing Towards Goal  Goal: *Participates in infant care  Outcome: Progressing Towards Goal  Goal: *Demonstrates progressive activity  Outcome: Progressing Towards Goal  Goal: *Tolerating diet  Outcome: Progressing Towards Goal     Problem:  Delivery: Postpartum Day 1  Goal: Off Pathway (Use only if patient is Off Pathway)  Outcome: Progressing Towards Goal  Goal: Activity/Safety  Outcome: Progressing Towards Goal  Goal: Consults, if ordered  Outcome: Progressing Towards Goal  Goal: Diagnostic Test/Procedures  Outcome: Progressing Towards Goal  Goal: Nutrition/Diet  Outcome: Progressing Towards Goal  Goal: Discharge Planning  Outcome: Progressing Towards Goal  Goal: Medications  Outcome: Progressing Towards Goal  Goal: Respiratory  Outcome: Progressing Towards Goal  Goal: Treatments/Interventions/Procedures  Outcome: Progressing Towards Goal  Goal: Psychosocial  Outcome: Progressing Towards Goal  Goal: *Vital signs within defined limits  Outcome: Progressing Towards Goal  Goal: *Labs within defined limits  Outcome: Progressing Towards Goal  Goal: *Hemodynamically stable  Outcome: Progressing Towards Goal  Goal: *Optimal pain control at patient's stated goal  Outcome: Progressing Towards Goal  Goal: *Participates in infant care  Outcome: Progressing Towards Goal  Goal: *Demonstrates progressive activity  Outcome: Progressing Towards Goal  Goal: *Tolerating diet  Outcome: Progressing Towards Goal  Goal: *Performs self perineal care  Outcome: Progressing Towards Goal     Problem:  Delivery: Postpartum Day 2  Goal: Off Pathway (Use only if patient is Off Pathway)  Outcome: Progressing Towards Goal  Goal: Activity/Safety  Outcome: Progressing Towards Goal  Goal: Consults, if ordered  Outcome: Progressing Towards Goal  Goal: Nutrition/Diet  Outcome: Progressing Towards Goal  Goal: Discharge Planning  Outcome: Progressing Towards Goal  Goal: Medications  Outcome: Progressing Towards Goal  Goal: Treatments/Interventions/Procedures  Outcome: Progressing Towards Goal  Goal: Psychosocial  Outcome: Progressing Towards Goal  Goal: *Vital signs within defined limits  Outcome: Progressing Towards Goal  Goal: *Labs within defined limits  Outcome: Progressing Towards Goal  Goal: *Hemodynamically stable  Outcome: Progressing Towards Goal  Goal: *Optimal pain control at patient's stated goal  Outcome: Progressing Towards Goal  Goal: *Participates in infant care  Outcome: Progressing Towards Goal  Goal: *Demonstrates progressive activity  Outcome: Progressing Towards Goal  Goal: *Appropriate parent-infant bonding  Outcome: Progressing Towards Goal  Goal: *Tolerating diet  Outcome: Progressing Towards Goal     Problem:  Delivery: Postpartum Day 3  Goal: Off Pathway (Use only if patient is Off Pathway)  Outcome: Progressing Towards Goal  Goal: Activity/Safety  Outcome: Progressing Towards Goal  Goal: Consults, if ordered  Outcome: Progressing Towards Goal  Goal: Nutrition/Diet  Outcome: Progressing Towards Goal  Goal: Discharge Planning  Outcome: Progressing Towards Goal  Goal: Medications  Outcome: Progressing Towards Goal  Goal: Treatments/Interventions/Procedures  Outcome: Progressing Towards Goal  Goal: Psychosocial  Outcome: Progressing Towards Goal     Problem:  Delivery: Discharge Outcomes  Goal: *Follow-up appointments as indicated  Outcome: Progressing Towards Goal  Goal: *Describes available resources and support systems  Outcome: Progressing Towards Goal  Goal: *No signs and symptoms of infection  Outcome: Progressing Towards Goal  Goal: *Birth certificate information completed  Outcome: Progressing Towards Goal  Goal: *Received and verbalizes understanding of discharge plan and instructions  Outcome: Progressing Towards Goal  Goal: *Vital signs within defined limits  Outcome: Progressing Towards Goal  Goal: *Labs within defined limits  Outcome: Progressing Towards Goal  Goal: *Hemodynamically stable  Outcome: Progressing Towards Goal  Goal: *Optimal pain control at patient's stated goal  Outcome: Progressing Towards Goal  Goal: *Participates in infant care  Outcome: Progressing Towards Goal  Goal: *Demonstrates progressive activity  Outcome: Progressing Towards Goal  Goal: *Appropriate parent-infant bonding  Outcome: Progressing Towards Goal  Goal: *Tolerating diet  Outcome: Progressing Towards Goal  Goal: *Verbalizes name, dosage, time, side effects, and number of days to continue medications  Outcome: Progressing Towards Goal  Goal: *Influenza vaccine administered (October-March)  Outcome: Progressing Towards Goal

## 2021-02-26 NOTE — PROGRESS NOTES
0730: Bedside and verbal shift change report given to SANKET Barker, RN and SANKET Granda, RN by Michelle Cervantes, RN. Assumed care of pt at this time. 0830: Assessed pt at this time. Pt denies concerns/questions at this time. 1430: Discharge teaching given to pt regarding signs\symptoms of infection, medications, postoperative care, follow up, circumcision care for infant, and general care for pt and infant. Pt denies question/concerns. Bands verified with MOB and FOB, HUGS tag disarmed, removed.     1452: Patient and infant safely discharged from unit

## 2021-02-26 NOTE — PROGRESS NOTES
Problem: Patient Education: Go to Patient Education Activity  Goal: Patient/Family Education  Outcome: Progressing Towards Goal     Problem: Pain  Goal: *Control of Pain  Outcome: Progressing Towards Goal     Problem: Patient Education: Go to Patient Education Activity  Goal: Patient/Family Education  Outcome: Progressing Towards Goal     Problem: Falls - Risk of  Goal: *Absence of Falls  Description: Document Ngozi Eden Fall Risk and appropriate interventions in the flowsheet. Outcome: Progressing Towards Goal  Note: Fall Risk Interventions:            Medication Interventions: Teach patient to arise slowly                   Problem: Patient Education: Go to Patient Education Activity  Goal: Patient/Family Education  Outcome: Progressing Towards Goal     Problem: Lactation Care Plan  Goal: *Infant latching appropriately  Outcome: Progressing Towards Goal  Goal: *Weight loss less than 10% of birth weight  Outcome: Progressing Towards Goal     Problem: Patient Education: Go to Patient Education Activity  Goal: Patient/Family Education  Outcome: Progressing Towards Goal     Problem: Pressure Injury - Risk of  Goal: *Prevention of pressure injury  Description: Document Steven Scale and appropriate interventions in the flowsheet. Outcome: Progressing Towards Goal  Note: Pressure Injury Interventions:             Activity Interventions: Pressure redistribution bed/mattress(bed type)    Mobility Interventions: Pressure redistribution bed/mattress (bed type)    Nutrition Interventions: Offer support with meals,snacks and hydration                     Problem: Patient Education: Go to Patient Education Activity  Goal: Patient/Family Education  Outcome: Progressing Towards Goal     Problem:  Delivery: Postpartum Day 1  Goal: Activity/Safety  Outcome: Progressing Towards Goal  Goal: Consults, if ordered  Outcome: Progressing Towards Goal  Goal: Diagnostic Test/Procedures  Outcome: Progressing Towards Goal  Goal: Nutrition/Diet  Outcome: Progressing Towards Goal  Goal: Discharge Planning  Outcome: Progressing Towards Goal  Goal: Medications  Outcome: Progressing Towards Goal  Goal: Respiratory  Outcome: Progressing Towards Goal  Goal: Treatments/Interventions/Procedures  Outcome: Progressing Towards Goal  Goal: Psychosocial  Outcome: Progressing Towards Goal  Goal: *Vital signs within defined limits  Outcome: Progressing Towards Goal  Goal: *Labs within defined limits  Outcome: Progressing Towards Goal  Goal: *Hemodynamically stable  Outcome: Progressing Towards Goal  Goal: *Optimal pain control at patient's stated goal  Outcome: Progressing Towards Goal  Goal: *Participates in infant care  Outcome: Progressing Towards Goal  Goal: *Demonstrates progressive activity  Outcome: Progressing Towards Goal  Goal: *Tolerating diet  Outcome: Progressing Towards Goal  Goal: *Performs self perineal care  Outcome: Progressing Towards Goal

## 2021-02-26 NOTE — LACTATION NOTE
This note was copied from a baby's chart. 1 per mom, infant latching and nursing well. Mom has been pumping after feeds, and supplementing per jose l order. Breastfeeding discharge teaching completed to include feeding on demand, foremilk and hindmilk importance, engorgement, mastitis, clogged ducts, pumping, breastmilk storage, and returning to work. Information given about unit and office phone numbers and encouraged mom to reach out if concerns arise, but that [facility] would be calling her in the next few days to follow up on breastfeeding. Mom verbalized understanding and no questions at this time.

## 2021-02-26 NOTE — PROGRESS NOTES
1925- Bedside and Verbal shift change report given to MARISELA Osuna RN (oncoming nurse) by SARITHA Rudd RN (offgoing nurse). Report included the following information SBAR, Intake/Output, MAR and Recent Results.     2135- Rounded on pt, laying comfortably in bed. Pt rates pain 4/10. Scheduled Tylenol given without complications.    2220- Rounded on pt, laying comfortably in bed talking with lactation consultant. No needs expressed at this time. Bed in lowest position, call bell in reach.    0000- Rounded on pt, laying comfortably in bed. No needs expressed at this time. Bed in lowest position, call bell in reach.    0245- Pt resting comfortably in bed with eyes closed. Chest expansion noted. Bed in lowest position, call bell within reach.    0340- Rounded on pt, laying comfortably in bed breastfeeding infant. No needs expressed at this time. Bed in lowest position, call bell in reach.    0530- Pt resting comfortably in bed with eyes closed. Chest expansion noted. Bed in lowest position, call bell within reach.    0630- Rounded on pt, laying comfortably in bed. No needs expressed at this time. Bed in lowest position, call bell in reach.    0730- Bedside and Verbal shift change report given to SARITHA Barker RN and SARITHA Sotelo RN (oncoming nurse) by MARISELA Osuna RN (offgoing nurse). Report included the following information SBAR, Intake/Output, MAR and Recent Results.

## 2021-02-26 NOTE — PROGRESS NOTES
0730 Bedside and verbal shift change report given to SARITHA Sotelo RN and SARITHA Barker RN by Noy Loera RN. Report given with use of SBAR, Kardex, MAR, I/O, Recent Results. Assumed care of pt at this time.      Charting reviewed for SARITHA Barker RN

## 2021-02-26 NOTE — LACTATION NOTE
Infant latched and nursing well. Discussed decreasing pumping as more milk has started to come in. Breastfeeding discharge teaching completed to include feeding on demand, foremilk and hindmilk importance, engorgement, mastitis, clogged ducts, pumping, breastmilk storage, and returning to work. Information given about unit and office phone numbers and encouraged mom to reach out if concerns arise, but that [facility] would be calling her in the next few days to follow up on breastfeeding. Mom verbalized understanding and no questions at this time.

## 2021-02-26 NOTE — PROGRESS NOTES
Problem: Pain  Goal: *Control of Pain  2/26/2021 1401 by Sb Carpenter ([facility] [place]), RN  Outcome: Resolved/Met  2/26/2021 1007 by Lydia Bowles, RN  Outcome: Progressing Towards Goal     Problem: Patient Education: Go to Patient Education Activity  Goal: Patient/Family Education  2/26/2021 1401 by Lydia Bowles, RN  Outcome: Resolved/Met  2/26/2021 1007 by Lydia Bowles, RN  Outcome: Progressing Towards Goal     Problem: Falls - Risk of  Goal: *Absence of Falls  Description: Document Clydene Bone Fall Risk and appropriate interventions in the flowsheet. 2/26/2021 1401 by Naatlee Basurto ([facility] [place]), RN  Outcome: Resolved/Met  2/26/2021 1007 by Sb Carpenter ([facility] [place]), RN  Outcome: Progressing Towards Goal  Note: Fall Risk Interventions:            Medication Interventions: Teach patient to arise slowly    Elimination Interventions: Call light in reach              Problem: Patient Education: Go to Patient Education Activity  Goal: Patient/Family Education  2/26/2021 1401 by Lydia Bowles, RN  Outcome: Resolved/Met  2/26/2021 1007 by Lydia Bowles, RN  Outcome: Progressing Towards Goal     Problem: Lactation Care Plan  Goal: *Infant latching appropriately  2/26/2021 1401 by Lydia Bowles, RN  Outcome: Resolved/Met  2/26/2021 1007 by Sb Carpenter ([facility] [place]), RN  Outcome: Progressing Towards Goal  Goal: *Weight loss less than 10% of birth weight  2/26/2021 1401 by Sb Carpenter ([facility] [place]), RN  Outcome: Resolved/Met  2/26/2021 1007 by Lydia Bowles, RN  Outcome: Progressing Towards Goal     Problem: Patient Education: Go to Patient Education Activity  Goal: Patient/Family Education  2/26/2021 1401 by Lydia Bowles, RN  Outcome: Resolved/Met  2/26/2021 1007 by Lydia Bowles, RN  Outcome: Progressing Towards Goal     Problem: Pressure Injury - Risk of  Goal: *Prevention of pressure injury  Description: Document Steven Scale and appropriate interventions in the flowsheet.   Outcome: Resolved/Met     Problem: Patient Education: Go to Patient Education Activity  Goal: Patient/Family Education  Outcome: Resolved/Met     Problem:  Delivery: Postpartum Day 1  Goal: Activity/Safety  2021 1401 by Lorie Basurto ([place]), RN  Outcome: Resolved/Met  2021 1007 by Sb Cárdenas ([place]), RN  Outcome: Progressing Towards Goal  Goal: Consults, if ordered  2021 1401 by Sb Cárdenas ([place]), RN  Outcome: Resolved/Met  2021 1007 by Sb Cárdenas ([place]), RN  Outcome: Progressing Towards Goal  Goal: Diagnostic Test/Procedures  2021 1401 by Lorie Basurto ([place]), RN  Outcome: Resolved/Met  2021 1007 by Sb Cárdenas ([place]), RN  Outcome: Progressing Towards Goal  Goal: Nutrition/Diet  2021 1401 by Lorie Basurto ([place]), RN  Outcome: Resolved/Met  2021 1007 by Sb Cárdenas ([place]), RN  Outcome: Progressing Towards Goal  Goal: Discharge Planning  2021 1401 by Janice Reyes, RN  Outcome: Resolved/Met  2021 1007 by Sb Cárdenas ([place]), RN  Outcome: Progressing Towards Goal  Goal: Medications  2021 1401 by Lorie Basurto ([place]), RN  Outcome: Resolved/Met  2021 1007 by Sb Cárdenas ([place]), RN  Outcome: Progressing Towards Goal  Goal: Respiratory  2021 1401 by Lorie Basurto ([place]), RN  Outcome: Resolved/Met  2021 1007 by Sb Cárdenas ([place]), RN  Outcome: Progressing Towards Goal  Goal: Treatments/Interventions/Procedures  2021 1401 by Lorie Basurto ([place]), RN  Outcome: Resolved/Met  2021 1007 by Sb Cárdenas ([place]), RN  Outcome: Progressing Towards Goal  Goal: Psychosocial  2021 1401 by Lorie Basurto ([place]), RN  Outcome: Resolved/Met  2021 1007 by Sb Cárdenas ([place]), RN  Outcome: Progressing Towards Goal  Goal: *Vital signs within defined limits  2021 1401 by Sb Barker ([place]), RN  Outcome: Resolved/Met  2021 1007 by Sb Cárdenas ([place]), RN  Outcome: Progressing Towards Goal  Goal: *Labs within defined limits  2021 1401 by Sb Barker ([place]), RN  Outcome: Resolved/Met  2021 1007 by Sb Cárdenas ([place]), RN  Outcome: Progressing Towards Goal  Goal: *Hemodynamically stable  2021 1401 by Sb Barker ([place]), RN  Outcome: Resolved/Met  2021 1007 by Sb Goins ([place]), RN  Outcome: Progressing Towards Goal  Goal: *Optimal pain control at patient's stated goal  2021 1401 by Sb Barker ([place]), RN  Outcome: Resolved/Met  2021 1007 by Sb Goins ([place]), RN  Outcome: Progressing Towards Goal  Goal: *Participates in infant care  2021 1401 by Sb Goins ([place]), RN  Outcome: Resolved/Met  2021 1007 by Case Tsang, RN  Outcome: Progressing Towards Goal  Goal: *Demonstrates progressive activity  2021 1401 by Sb Goins ([place]), RN  Outcome: Resolved/Met  2021 1007 by Sb Goins ([place]), RN  Outcome: Progressing Towards Goal  Goal: *Tolerating diet  2021 1401 by Sb Goins ([place]), RN  Outcome: Resolved/Met  2021 1007 by Sb Goins ([place]), RN  Outcome: Progressing Towards Goal  Goal: *Performs self perineal care  2021 1401 by Case Tsang, RN  Outcome: Resolved/Met  2021 1007 by Case Tsang, RN  Outcome: Progressing Towards Goal     Problem: Patient Education: Go to Patient Education Activity  Goal: Patient/Family Education  2021 1401 by Case Side, RN  Outcome: Resolved/Met  2021 1007 by Case Tsang, RN  Outcome: Progressing Towards Goal     Problem:  Delivery: Day of Delivery  Goal: Off Pathway (Use only if patient is Off Pathway)  2021 1401 by Sb Goins ([place]), RN  Outcome: Resolved/Met  2021 1007 by Sb Goins ([place]), RN  Outcome: Progressing Towards Goal  Goal: Activity/Safety  2021 1401 by All Basurto ([place]), RN  Outcome: Resolved/Met  2021 1007 by Sb Goins ([place]), RN  Outcome: Progressing Towards Goal  Goal: Consults, if ordered  2021 1401 by Sb Goins ([place]), RN  Outcome: Resolved/Met  2021 1007 by All Basurto ([place]), RN  Outcome: Progressing Towards Goal  Goal: Diagnostic Test/Procedures  2021 1401 by All Basurto ([place]), RN  Outcome: Resolved/Met  2021 1007 by Sb Goins ([place]), RN  Outcome: Progressing Towards Goal  Goal: Nutrition/Diet  2/26/2021 1401 by Genoveva Larsen, RN  Outcome: Resolved/Met  2/26/2021 1007 by Sb Tovar ([place]), RN  Outcome: Progressing Towards Goal  Goal: Discharge Planning  2/26/2021 1401 by Genoveva Larsen, RN  Outcome: Resolved/Met  2/26/2021 1007 by Sb Tovar ([place]), RN  Outcome: Progressing Towards Goal  Goal: Medications  2/26/2021 1401 by Radha Basurto ([place]), RN  Outcome: Resolved/Met  2/26/2021 1007 by Sb Tovar ([place]), RN  Outcome: Progressing Towards Goal  Goal: Respiratory  2/26/2021 1401 by Radha Basurto ([place]), RN  Outcome: Resolved/Met  2/26/2021 1007 by Sb Tovar ([place]), RN  Outcome: Progressing Towards Goal  Goal: Treatments/Interventions/Procedures  2/26/2021 1401 by Radha Basurto ([place]), RN  Outcome: Resolved/Met  2/26/2021 1007 by Sb Tovar ([place]), RN  Outcome: Progressing Towards Goal  Goal: Psychosocial  2/26/2021 1401 by Radha Basurto ([place]), RN  Outcome: Resolved/Met  2/26/2021 1007 by Sb Tovar ([place]), RN  Outcome: Progressing Towards Goal  Goal: *Vital signs within defined limits  2/26/2021 1401 by Sb Barker ([place]), RN  Outcome: Resolved/Met  2/26/2021 1007 by Sb Tovar ([place]), RN  Outcome: Progressing Towards Goal  Goal: *Labs within defined limits  2/26/2021 1401 by Sb Barker ([place]), RN  Outcome: Resolved/Met  2/26/2021 1007 by Sb Tovar ([place]), RN  Outcome: Progressing Towards Goal  Goal: *Hemodynamically stable  2/26/2021 1401 by Sb Tovar ([place]), RN  Outcome: Resolved/Met  2/26/2021 1007 by Genoveva Larsen, RN  Outcome: Progressing Towards Goal  Goal: *Optimal pain control at patient's stated goal  2/26/2021 1401 by Sb Barker ([place]), RN  Outcome: Resolved/Met  2/26/2021 1007 by Genoveva Larsen, RN  Outcome: Progressing Towards Goal  Goal: *Participates in infant care  [facility]/26/2021 14[facility]  Outcome: Resolved/Met  2/26/2021 1007 by Genoveva Larsen, RN  Outcome: Progressing Towards Goal  Goal: *Demonstrates progressive activity  2/26/2021 1401 by Case Side, RN  Outcome: Resolved/Met  2021 1007 by Valentino Kaur, [facility] ([place]), RN  Outcome: Progressing Towards Goal  Goal: *Tolerating diet  2021 1401 by Valentino Kaur, [facility] [place] RN  Outcome: Resolved/Met  2021 1007 by Case Side, RN  Outcome: Progressing Towards Goal     Problem:  Delivery: Postpartum Day 1  Goal: Off Pathway (Use only if patient is Off Pathway)  2021 1401 by Valentino Kaur, [facility] [place] RN  Outcome: Resolved/Met  2021 1007 by Carolinevelyleodan Mediate, [facility] [place] RN  Outcome: Progressing Towards Goal  Goal: Activity/Safety  2021 1401 by Milon Push [facility] ([place]), RN  Outcome: Resolved/Met  2021 1007 by Valentino Kaur [facility] [place] RN  Outcome: Progressing Towards Goal  Goal: Consults, if ordered  2021 1401 by Valentino Kaur, [facility] [place] RN  Outcome: Resolved/Met  2021 1007 by Valentino Kaur, [facility] [place] RN  Outcome: Progressing Towards Goal  Goal: Diagnostic Test/Procedures  2021 1401 by Case Side, RN  Outcome: Resolved/Met  2021 1007 by Case Side, RN  Outcome: Progressing Towards Goal  Goal: Nutrition/Diet  2021 1401 by Milon Push [facility] ([place] RN  Outcome: Resolved/Met  2021 1007 by Valentino Kaur, [facility] [place] RN  Outcome: Progressing Towards Goal  Goal: Discharge Planning  2021 1401 by Case Side, RN  Outcome: Resolved/Met  2021 1007 by Carolinevelyleodan Kaur [facility] ([place] RN  Outcome: Progressing Towards Goal  Goal: Medications  2021 1401 by Milon Push [facility] [place] RN  Outcome: Resolved/Met  2021 1007 by Valentino Kaur [facility] [place] RN  Outcome: Progressing Towards Goal  Goal: Respiratory  2021 1401 by Milon Susana [facility] ([place] RN  Outcome: Resolved/Met  2021 1007 by Sb Goins ([place]), RN  Outcome: Progressing Towards Goal  Goal: Treatments/Interventions/Procedures  2021 1401 by Milon Susana [facility] ([place] RN  Outcome: Resolved/Met  2021 1007 by Case Tsang, RN  Outcome: Progressing Towards Goal  Goal: Psychosocial  2021 1401 by Case Tsang, RN  Outcome: Resolved/Met  2021 1007 by Sb Goins ([place]), RN  Outcome: Progressing Towards Goal  Goal: *Vital signs within defined limits  2021 1401 by Sb [place] RN  Outcome: Resolved/Met  2021 1007 by Sb Ramirez ([place]), RN  Outcome: Progressing Towards Goal  Goal: *Labs within defined limits  2021 1401 by Sb Barker ([place]), RN  Outcome: Resolved/Met  2021 1007 by Sb Ramirez ([place]), RN  Outcome: Progressing Towards Goal  Goal: *Hemodynamically stable  2021 1401 by Sb Ramirez ([place]), RN  Outcome: Resolved/Met  2021 1007 by Sb Ramirez ([place]), RN  Outcome: Progressing Towards Goal  Goal: *Optimal pain control at patient's stated goal  2021 1401 by Sb Barker ([place]), RN  Outcome: Resolved/Met  2021 1007 by Sb Ramirez ([place]), RN  Outcome: Progressing Towards Goal  Goal: *Participates in infant care  2021 1401 by Steve Rabago, RN  Outcome: Resolved/Met  2021 1007 by Steve Rabago RN  Outcome: Progressing Towards Goal  Goal: *Demonstrates progressive activity  2021 1401 by Steve Rabago, RN  Outcome: Resolved/Met  2021 1007 by Steve Rabago RN  Outcome: Progressing Towards Goal  Goal: *Tolerating diet  2021 1401 by Steve Rabago, RN  Outcome: Resolved/Met  2021 1007 by Steve Rabago RN  Outcome: Progressing Towards Goal  Goal: *Performs self perineal care  2021 1401 by Steve Rabago, RN  Outcome: Resolved/Met  2021 1007 by Steve Rabago RN  Outcome: Progressing Towards Goal     Problem:  Delivery: Postpartum Day 2  Goal: Off Pathway (Use only if patient is Off Pathway)  2021 1401 by Dwayne Basurto ([place]), RN  Outcome: Resolved/Met  2021 1007 by Dwayne Basurto ([place]), RN  Outcome: Progressing Towards Goal  Goal: Activity/Safety  2021 1401 by Dwayne Basurto ([place]), RN  Outcome: Resolved/Met  2021 1007 by Steve Rabago RN  Outcome: Progressing Towards Goal  Goal: Consults, if ordered  2021 1401 by Steve Rabago RN  Outcome: Resolved/Met  2021 1007 by Mj Sb ([place]), RN  Outcome: Progressing Towards Goal  Goal: Nutrition/Diet  2/26/2021 1401 by Sukh Basurto ([place]), RN  Outcome: Resolved/Met  2/26/2021 1007 by Sb Reyes ([place]), RN  Outcome: Progressing Towards Goal  Goal: Discharge Planning  2/26/2021 1401 by Kevin Dunn, RN  Outcome: Resolved/Met  2/26/2021 1007 by Sb Reyes ([place]), RN  Outcome: Progressing Towards Goal  Goal: Medications  2/26/2021 1401 by Sukh Basurto ([place]), RN  Outcome: Resolved/Met  2/26/2021 1007 by Sb Reyes ([place]), RN  Outcome: Progressing Towards Goal  Goal: Treatments/Interventions/Procedures  2/26/2021 1401 by Sukh Basurto ([place]), RN  Outcome: Resolved/Met  2/26/2021 1007 by Sb Reyes ([place]), RN  Outcome: Progressing Towards Goal  Goal: Psychosocial  2/26/2021 1401 by Sukh Basurto ([place]), RN  Outcome: Resolved/Met  2/26/2021 1007 by Kevin Dunn, RN  Outcome: Progressing Towards Goal  Goal: *Vital signs within defined limits  2/26/2021 1401 by Sb Barker ([place]), RN  Outcome: Resolved/Met  2/26/2021 1007 by Sb Reyes ([place]), RN  Outcome: Progressing Towards Goal  Goal: *Labs within defined limits  2/26/2021 1401 by Sb Barker ([place]), RN  Outcome: Resolved/Met  2/26/2021 1007 by Sb Reyes ([place]), RN  Outcome: Progressing Towards Goal  Goal: *Hemodynamically stable  2/26/2021 1401 by Sb Reyes ([place]), RN  Outcome: Resolved/Met  2/26/2021 1007 by Kevin Dunn, RN  Outcome: Progressing Towards Goal  Goal: *Optimal pain control at patient's stated goal  2/26/2021 1401 by Sb Barker ([place]), RN  Outcome: Resolved/Met  2/26/2021 1007 by Sb Reyes ([place]), RN  Outcome: Progressing Towards Goal  Goal: *Participates in infant care  2/26/2021 1401 by Sb Reyes ([place]), RN  Outcome: Resolved/Met  2/26/2021 1007 by Kevin Dunn, RN  Outcome: Progressing Towards Goal  Goal: *Demonstrates progressive activity  2/26/2021 1401 by Sb Reyes ([place]), RN  Outcome: Resolved/Met  2/26/2021 1007 by Kevin Dunn, RN  Outcome: Progressing Towards Goal  Goal: *Appropriate parent-infant bonding  2021 1401 by Steve Rabago, RN  Outcome: Resolved/Met  2021 1007 by Sb Ramirez ([place]), RN  Outcome: Progressing Towards Goal  Goal: *Tolerating diet  2021 1401 by Sb Ramirez ([place]), RN  Outcome: Resolved/Met  2021 1007 by Sb Ramirez ([place]), RN  Outcome: Progressing Towards Goal     Problem:  Delivery: Postpartum Day 3  Goal: Off Pathway (Use only if patient is Off Pathway)  2021 1401 by Sb Ramirez ([place]), RN  Outcome: Resolved/Met  2021 1007 by Sb Ramirez ([place]), RN  Outcome: Progressing Towards Goal  Goal: Activity/Safety  2021 1401 by Dwayne Basurto ([place]), RN  Outcome: Resolved/Met  2021 1007 by Sb Ramirez ([place]), RN  Outcome: Progressing Towards Goal  Goal: Consults, if ordered  2021 1401 by Sb Ramirez ([place]), RN  Outcome: Resolved/Met  2021 1007 by Sb Ramirez ([place]), RN  Outcome: Progressing Towards Goal  Goal: Nutrition/Diet  2021 1401 by Dwayne Basurto ([place]), RN  Outcome: Resolved/Met  2021 1007 by Sb Ramirez ([place]), RN  Outcome: Progressing Towards Goal  Goal: Discharge Planning  2021 1401 by Steve Rabago, RN  Outcome: Resolved/Met  2021 1007 by Sb Ramirez ([place]), RN  Outcome: Progressing Towards Goal  Goal: Medications  2021 1401 by Dwayne Basurto ([place]), RN  Outcome: Resolved/Met  2021 1007 by Sb Ramirez ([place]), RN  Outcome: Progressing Towards Goal  Goal: Treatments/Interventions/Procedures  2021 1401 by Dwayne Basurto ([place]), RN  Outcome: Resolved/Met  2021 1007 by Sb Ramirez ([place]), RN  Outcome: Progressing Towards Goal  Goal: Psychosocial  2021 1401 by Dwayne Basurto ([place]), RN  Outcome: Resolved/Met  2021 1007 by Steve Rabago RN  Outcome: Progressing Towards Goal     Problem:  Delivery: Discharge Outcomes  Goal: *Follow-up appointments as indicated  2021 1401 by Steve Rabago RN  Outcome: Resolved/Met  2021 1007 by Steve Rabago RN  Outcome: Progressing Towards Goal  Goal: *Describes available resources and support systems  2/26/2021 1401 by Case Tsang, RN  Outcome: Resolved/Met  2/26/2021 1007 by Sb Goins ([place]), RN  Outcome: Progressing Towards Goal  Goal: *No signs and symptoms of infection  2/26/2021 1401 by Sb Goins ([place]), RN  Outcome: Resolved/Met  2/26/2021 1007 by Sb Goins ([place]), RN  Outcome: Progressing Towards Goal  Goal: *Birth certificate information completed  2/26/2021 1401 by Sb Goins ([place]), RN  Outcome: Resolved/Met  2/26/2021 1007 by Sb Goins ([place]), RN  Outcome: Progressing Towards Goal  Goal: *Received and verbalizes understanding of discharge plan and instructions  2/26/2021 1401 by Sb Goins ([place]), RN  Outcome: Resolved/Met  2/26/2021 1007 by Sb Goins ([place]), RN  Outcome: Progressing Towards Goal  Goal: *Vital signs within defined limits  2/26/2021 1401 by Sb Barker ([place]), RN  Outcome: Resolved/Met  2/26/2021 1007 by Sb Goins ([place]), RN  Outcome: Progressing Towards Goal  Goal: *Labs within defined limits  2/26/2021 1401 by Sb Barker ([place]), RN  Outcome: Resolved/Met  2/26/2021 1007 by Sb Goins ([place]), RN  Outcome: Progressing Towards Goal  Goal: *Hemodynamically stable  2/26/2021 1401 by Sb Goins ([place]), RN  Outcome: Resolved/Met  2/26/2021 1007 by Case Tsang, RN  Outcome: Progressing Towards Goal  Goal: *Optimal pain control at patient's stated goal  2/26/2021 1401 by Case Tsang, RN  Outcome: Resolved/Met  2/26/2021 1007 by Sb Goins ([place]), RN  Outcome: Progressing Towards Goal  Goal: *Participates in infant care  2/26/2021 1401 by Sb Goins ([place]), RN  Outcome: Resolved/Met  2/26/2021 1007 by Sb Goins ([place]), RN  Outcome: Progressing Towards Goal  Goal: *Demonstrates progressive activity  2/26/2021 1401 by Case Tsang, RN  Outcome: Resolved/Met  [facility]26/2021 [facility]  Outcome: Progressing Towards Goal  Goal: *Appropriate parent-infant bonding  2/26/2021 1401 by Case Tsang, RN  Outcome: Resolved/Met  2/26/2021 1007 by Case Tsang, RN  Outcome: Progressing Towards Goal  Goal: *Tolerating diet  2/26/2021 1401 by Sb Bolaños ([place]), RN  Outcome: Resolved/Met  2/26/2021 1007 by Sb Bolaños ([place]), RN  Outcome: Progressing Towards Goal  Goal: *Verbalizes name, dosage, time, side effects, and number of days to continue medications  2/26/2021 1401 by Sb Barker ([place]), RN  Outcome: Resolved/Met  2/26/2021 1007 by Sb Bolaños ([place]), RN  Outcome: Progressing Towards Goal  Goal: *Influenza vaccine administered (October-March)  2/26/2021 1401 by Kadi Basurto ([place]), RN  Outcome: Resolved/Met  2/26/2021 1007 by Monster Puga, RN  Outcome: Progressing Towards Goal

## 2023-01-25 NOTE — INTERVAL H&P NOTE
Update History & Physical 
 
The Patient's History and Physical was reviewed with the patient and I examined the patient. Failure to progress in labor. Will plan for LTCS. The surgical site was confirmed by the patient and me. Plan:  The risk, benefits, expected outcome, and alternative to the recommended procedure have been discussed with the patient. Patient understands and wants to proceed with the procedure.  
 
Electronically signed by Ember Choe MD on 2/23/2021 at 4:43 PM 

Negative

## (undated) DEVICE — GARMENT,MEDLINE,DVT,INT,CALF,MED, GEN2: Brand: MEDLINE

## (undated) DEVICE — SOL IRRIGATION INJ NACL 0.9% 500ML BTL

## (undated) DEVICE — SUTURE VCRL SZ 0 L36IN ABSRB UD L36MM CT-1 1/2 CIR J946H

## (undated) DEVICE — INTENDED FOR TISSUE SEPARATION, AND OTHER PROCEDURES THAT REQUIRE A SHARP SURGICAL BLADE TO PUNCTURE OR CUT.: Brand: BARD-PARKER ® CARBON RIB-BACK BLADES

## (undated) DEVICE — STRAP,POSITIONING,KNEE/BODY,FOAM,4X60": Brand: MEDLINE

## (undated) DEVICE — SUTURE VCRL SZ 0 L36IN ABSRB VLT L40MM CT 1/2 CIR J358H

## (undated) DEVICE — STERILE LATEX POWDER-FREE SURGICAL GLOVESWITH NITRILE COATING: Brand: PROTEXIS

## (undated) DEVICE — SPONGE LAP 18X18IN STRL -- 5/PK

## (undated) DEVICE — PAD,ABDOMINAL,5"X9",ST,LF,25/BX: Brand: MEDLINE INDUSTRIES, INC.

## (undated) DEVICE — SPONGE GZ W4XL4IN COT 12 PLY TYP VII WVN C FLD DSGN

## (undated) DEVICE — BSMI CSECTION BIRTHING PACK: Brand: MEDLINE INDUSTRIES, INC.

## (undated) DEVICE — STAPLER SKIN SQ 30 ABSRB STPL DISP INSORB

## (undated) DEVICE — AGENT HEMSTAT 3GM PURIFIED PLNT STARCH PWD ABSRB ARISTA AH

## (undated) DEVICE — 3L THIN WALL CAN: Brand: CRD